# Patient Record
Sex: MALE | Race: WHITE | NOT HISPANIC OR LATINO
[De-identification: names, ages, dates, MRNs, and addresses within clinical notes are randomized per-mention and may not be internally consistent; named-entity substitution may affect disease eponyms.]

---

## 2017-01-25 ENCOUNTER — APPOINTMENT (OUTPATIENT)
Dept: FAMILY MEDICINE | Facility: CLINIC | Age: 82
End: 2017-01-25

## 2017-01-25 VITALS — DIASTOLIC BLOOD PRESSURE: 60 MMHG | SYSTOLIC BLOOD PRESSURE: 110 MMHG

## 2017-01-25 VITALS — TEMPERATURE: 97.7 F

## 2017-01-25 DIAGNOSIS — R51 HEADACHE: ICD-10-CM

## 2017-01-25 DIAGNOSIS — I46.9 CARDIAC ARREST, CAUSE UNSPECIFIED: ICD-10-CM

## 2017-02-23 ENCOUNTER — APPOINTMENT (OUTPATIENT)
Dept: FAMILY MEDICINE | Facility: CLINIC | Age: 82
End: 2017-02-23

## 2017-02-23 VITALS — SYSTOLIC BLOOD PRESSURE: 100 MMHG | DIASTOLIC BLOOD PRESSURE: 60 MMHG | TEMPERATURE: 98.3 F

## 2017-02-23 RX ORDER — QUETIAPINE 25 MG/1
25 TABLET, FILM COATED ORAL
Refills: 0 | Status: ACTIVE | COMMUNITY

## 2017-02-23 RX ORDER — ATROPINE SULFATE 10 MG/ML
1 SOLUTION OPHTHALMIC
Refills: 0 | Status: ACTIVE | COMMUNITY

## 2017-02-23 RX ORDER — RIVASTIGMINE 13.3 MG/24H
PATCH, EXTENDED RELEASE TRANSDERMAL
Refills: 0 | Status: ACTIVE | COMMUNITY

## 2017-03-08 ENCOUNTER — INPATIENT (INPATIENT)
Facility: HOSPITAL | Age: 82
LOS: 0 days | Discharge: HOME | DRG: 56 | End: 2017-03-09
Attending: HOSPITALIST | Admitting: INTERNAL MEDICINE
Payer: MEDICARE

## 2017-03-08 VITALS
SYSTOLIC BLOOD PRESSURE: 141 MMHG | HEART RATE: 67 BPM | TEMPERATURE: 98 F | RESPIRATION RATE: 18 BRPM | OXYGEN SATURATION: 99 % | DIASTOLIC BLOOD PRESSURE: 76 MMHG

## 2017-03-08 DIAGNOSIS — Z98.89 OTHER SPECIFIED POSTPROCEDURAL STATES: Chronic | ICD-10-CM

## 2017-03-08 DIAGNOSIS — G20 PARKINSON'S DISEASE: ICD-10-CM

## 2017-03-08 DIAGNOSIS — I10 ESSENTIAL (PRIMARY) HYPERTENSION: ICD-10-CM

## 2017-03-08 DIAGNOSIS — Z95.1 PRESENCE OF AORTOCORONARY BYPASS GRAFT: Chronic | ICD-10-CM

## 2017-03-08 DIAGNOSIS — I25.10 ATHEROSCLEROTIC HEART DISEASE OF NATIVE CORONARY ARTERY WITHOUT ANGINA PECTORIS: ICD-10-CM

## 2017-03-08 DIAGNOSIS — N40.0 BENIGN PROSTATIC HYPERPLASIA WITHOUT LOWER URINARY TRACT SYMPTOMS: ICD-10-CM

## 2017-03-08 DIAGNOSIS — Z41.8 ENCOUNTER FOR OTHER PROCEDURES FOR PURPOSES OTHER THAN REMEDYING HEALTH STATE: ICD-10-CM

## 2017-03-08 DIAGNOSIS — R55 SYNCOPE AND COLLAPSE: ICD-10-CM

## 2017-03-08 LAB
ALBUMIN SERPL ELPH-MCNC: 4.1 G/DL — SIGNIFICANT CHANGE UP (ref 3.3–5)
ALP SERPL-CCNC: 141 U/L — HIGH (ref 40–120)
ALT FLD-CCNC: 5 U/L RC — LOW (ref 10–45)
ANION GAP SERPL CALC-SCNC: 12 MMOL/L — SIGNIFICANT CHANGE UP (ref 5–17)
APPEARANCE UR: CLEAR — SIGNIFICANT CHANGE UP
APTT BLD: 24.9 SEC — LOW (ref 27.5–37.4)
AST SERPL-CCNC: 15 U/L — SIGNIFICANT CHANGE UP (ref 10–40)
BASOPHILS # BLD AUTO: 0 K/UL — SIGNIFICANT CHANGE UP (ref 0–0.2)
BASOPHILS NFR BLD AUTO: 0 % — SIGNIFICANT CHANGE UP (ref 0–2)
BILIRUB SERPL-MCNC: 0.8 MG/DL — SIGNIFICANT CHANGE UP (ref 0.2–1.2)
BILIRUB UR-MCNC: NEGATIVE — SIGNIFICANT CHANGE UP
BUN SERPL-MCNC: 22 MG/DL — SIGNIFICANT CHANGE UP (ref 7–23)
CALCIUM SERPL-MCNC: 9.3 MG/DL — SIGNIFICANT CHANGE UP (ref 8.4–10.5)
CHLORIDE SERPL-SCNC: 105 MMOL/L — SIGNIFICANT CHANGE UP (ref 96–108)
CK MB CFR SERPL CALC: 2.5 NG/ML — SIGNIFICANT CHANGE UP (ref 0–6.7)
CO2 SERPL-SCNC: 27 MMOL/L — SIGNIFICANT CHANGE UP (ref 22–31)
COLOR SPEC: YELLOW — SIGNIFICANT CHANGE UP
CREAT SERPL-MCNC: 1.13 MG/DL — SIGNIFICANT CHANGE UP (ref 0.5–1.3)
DIFF PNL FLD: ABNORMAL
EOSINOPHIL # BLD AUTO: 0.2 K/UL — SIGNIFICANT CHANGE UP (ref 0–0.5)
EOSINOPHIL NFR BLD AUTO: 1.9 % — SIGNIFICANT CHANGE UP (ref 0–6)
GLUCOSE SERPL-MCNC: 149 MG/DL — HIGH (ref 70–99)
GLUCOSE UR QL: NEGATIVE — SIGNIFICANT CHANGE UP
HCT VFR BLD CALC: 38.2 % — LOW (ref 39–50)
HGB BLD-MCNC: 13.5 G/DL — SIGNIFICANT CHANGE UP (ref 13–17)
INR BLD: 1.07 RATIO — SIGNIFICANT CHANGE UP (ref 0.88–1.16)
KETONES UR-MCNC: NEGATIVE — SIGNIFICANT CHANGE UP
LEUKOCYTE ESTERASE UR-ACNC: NEGATIVE — SIGNIFICANT CHANGE UP
LYMPHOCYTES # BLD AUTO: 28.4 % — SIGNIFICANT CHANGE UP (ref 13–44)
LYMPHOCYTES # BLD AUTO: 3.3 K/UL — SIGNIFICANT CHANGE UP (ref 1–3.3)
MCHC RBC-ENTMCNC: 33.7 PG — SIGNIFICANT CHANGE UP (ref 27–34)
MCHC RBC-ENTMCNC: 35.2 GM/DL — SIGNIFICANT CHANGE UP (ref 32–36)
MCV RBC AUTO: 95.6 FL — SIGNIFICANT CHANGE UP (ref 80–100)
MONOCYTES # BLD AUTO: 0.7 K/UL — SIGNIFICANT CHANGE UP (ref 0–0.9)
MONOCYTES NFR BLD AUTO: 6 % — SIGNIFICANT CHANGE UP (ref 2–14)
NEUTROPHILS # BLD AUTO: 7.3 K/UL — SIGNIFICANT CHANGE UP (ref 1.8–7.4)
NEUTROPHILS NFR BLD AUTO: 63.6 % — SIGNIFICANT CHANGE UP (ref 43–77)
NITRITE UR-MCNC: NEGATIVE — SIGNIFICANT CHANGE UP
PH UR: 7 — SIGNIFICANT CHANGE UP (ref 4.8–8)
PLATELET # BLD AUTO: 206 K/UL — SIGNIFICANT CHANGE UP (ref 150–400)
POTASSIUM SERPL-MCNC: 4.7 MMOL/L — SIGNIFICANT CHANGE UP (ref 3.5–5.3)
POTASSIUM SERPL-SCNC: 4.7 MMOL/L — SIGNIFICANT CHANGE UP (ref 3.5–5.3)
PROT SERPL-MCNC: 7.2 G/DL — SIGNIFICANT CHANGE UP (ref 6–8.3)
PROT UR-MCNC: SIGNIFICANT CHANGE UP
PROTHROM AB SERPL-ACNC: 11.6 SEC — SIGNIFICANT CHANGE UP (ref 10–13.1)
RBC # BLD: 3.99 M/UL — LOW (ref 4.2–5.8)
RBC # FLD: 13.7 % — SIGNIFICANT CHANGE UP (ref 10.3–14.5)
SODIUM SERPL-SCNC: 144 MMOL/L — SIGNIFICANT CHANGE UP (ref 135–145)
SP GR SPEC: 1.02 — SIGNIFICANT CHANGE UP (ref 1.01–1.02)
TROPONIN T SERPL-MCNC: <0.01 NG/ML — SIGNIFICANT CHANGE UP (ref 0–0.06)
UROBILINOGEN FLD QL: 1
WBC # BLD: 11.5 K/UL — HIGH (ref 3.8–10.5)
WBC # FLD AUTO: 11.5 K/UL — HIGH (ref 3.8–10.5)

## 2017-03-08 PROCEDURE — 99223 1ST HOSP IP/OBS HIGH 75: CPT | Mod: GC

## 2017-03-08 PROCEDURE — 99285 EMERGENCY DEPT VISIT HI MDM: CPT

## 2017-03-08 RX ORDER — ONDANSETRON 8 MG/1
4 TABLET, FILM COATED ORAL ONCE
Qty: 0 | Refills: 0 | Status: COMPLETED | OUTPATIENT
Start: 2017-03-08 | End: 2017-03-08

## 2017-03-08 RX ORDER — ASPIRIN/CALCIUM CARB/MAGNESIUM 324 MG
81 TABLET ORAL DAILY
Qty: 0 | Refills: 0 | Status: DISCONTINUED | OUTPATIENT
Start: 2017-03-08 | End: 2017-03-09

## 2017-03-08 RX ORDER — DOCUSATE SODIUM 100 MG
100 CAPSULE ORAL
Qty: 0 | Refills: 0 | Status: DISCONTINUED | OUTPATIENT
Start: 2017-03-08 | End: 2017-03-09

## 2017-03-08 RX ORDER — FOLIC ACID 0.8 MG
1 TABLET ORAL DAILY
Qty: 0 | Refills: 0 | Status: DISCONTINUED | OUTPATIENT
Start: 2017-03-08 | End: 2017-03-09

## 2017-03-08 RX ORDER — CHOLECALCIFEROL (VITAMIN D3) 125 MCG
400 CAPSULE ORAL DAILY
Qty: 0 | Refills: 0 | Status: DISCONTINUED | OUTPATIENT
Start: 2017-03-08 | End: 2017-03-09

## 2017-03-08 RX ORDER — LOSARTAN POTASSIUM 100 MG/1
25 TABLET, FILM COATED ORAL DAILY
Qty: 0 | Refills: 0 | Status: DISCONTINUED | OUTPATIENT
Start: 2017-03-08 | End: 2017-03-09

## 2017-03-08 RX ORDER — PANTOPRAZOLE SODIUM 20 MG/1
40 TABLET, DELAYED RELEASE ORAL
Qty: 0 | Refills: 0 | Status: DISCONTINUED | OUTPATIENT
Start: 2017-03-08 | End: 2017-03-09

## 2017-03-08 RX ORDER — ENOXAPARIN SODIUM 100 MG/ML
40 INJECTION SUBCUTANEOUS EVERY 24 HOURS
Qty: 0 | Refills: 0 | Status: DISCONTINUED | OUTPATIENT
Start: 2017-03-08 | End: 2017-03-09

## 2017-03-08 RX ORDER — ATORVASTATIN CALCIUM 80 MG/1
40 TABLET, FILM COATED ORAL AT BEDTIME
Qty: 0 | Refills: 0 | Status: DISCONTINUED | OUTPATIENT
Start: 2017-03-08 | End: 2017-03-09

## 2017-03-08 RX ORDER — CARBIDOPA AND LEVODOPA 25; 100 MG/1; MG/1
1.5 TABLET ORAL THREE TIMES A DAY
Qty: 0 | Refills: 0 | Status: DISCONTINUED | OUTPATIENT
Start: 2017-03-08 | End: 2017-03-09

## 2017-03-08 RX ORDER — FINASTERIDE 5 MG/1
5 TABLET, FILM COATED ORAL DAILY
Qty: 0 | Refills: 0 | Status: DISCONTINUED | OUTPATIENT
Start: 2017-03-08 | End: 2017-03-09

## 2017-03-08 RX ORDER — SODIUM CHLORIDE 9 MG/ML
500 INJECTION INTRAMUSCULAR; INTRAVENOUS; SUBCUTANEOUS ONCE
Qty: 0 | Refills: 0 | Status: COMPLETED | OUTPATIENT
Start: 2017-03-08 | End: 2017-03-08

## 2017-03-08 RX ORDER — CARBIDOPA AND LEVODOPA 25; 100 MG/1; MG/1
1 TABLET ORAL AT BEDTIME
Qty: 0 | Refills: 0 | Status: DISCONTINUED | OUTPATIENT
Start: 2017-03-08 | End: 2017-03-09

## 2017-03-08 RX ADMIN — ENOXAPARIN SODIUM 40 MILLIGRAM(S): 100 INJECTION SUBCUTANEOUS at 22:55

## 2017-03-08 RX ADMIN — SODIUM CHLORIDE 500 MILLILITER(S): 9 INJECTION INTRAMUSCULAR; INTRAVENOUS; SUBCUTANEOUS at 11:01

## 2017-03-08 RX ADMIN — Medication 400 UNIT(S): at 17:19

## 2017-03-08 RX ADMIN — CARBIDOPA AND LEVODOPA 1 TABLET(S): 25; 100 TABLET ORAL at 23:12

## 2017-03-08 RX ADMIN — ATORVASTATIN CALCIUM 40 MILLIGRAM(S): 80 TABLET, FILM COATED ORAL at 22:55

## 2017-03-08 RX ADMIN — ONDANSETRON 4 MILLIGRAM(S): 8 TABLET, FILM COATED ORAL at 11:01

## 2017-03-08 RX ADMIN — CARBIDOPA AND LEVODOPA 1.5 TABLET(S): 25; 100 TABLET ORAL at 17:19

## 2017-03-08 RX ADMIN — Medication 100 MILLIGRAM(S): at 17:19

## 2017-03-08 RX ADMIN — LOSARTAN POTASSIUM 25 MILLIGRAM(S): 100 TABLET, FILM COATED ORAL at 17:20

## 2017-03-08 RX ADMIN — Medication 1 MILLIGRAM(S): at 17:20

## 2017-03-08 RX ADMIN — FINASTERIDE 5 MILLIGRAM(S): 5 TABLET, FILM COATED ORAL at 17:19

## 2017-03-08 NOTE — H&P ADULT. - PROBLEM SELECTOR PLAN 3
Continue with Sinemet + selegline. Will also continue seroquel which was added due to patient's visual hallucinations

## 2017-03-08 NOTE — H&P ADULT. - ASSESSMENT
84 year old male PMH of Parkinson’s (10 years), CAD s/p CABG in 1988 complicated by an MI in 1998 from bypass occlusion,  HTN and hx of syncope (last 2/17) in the past presents with an episode of syncope. Patient has extensive hx of syncope with negative acute neuro or cardiac workup in the past. 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

## 2017-03-08 NOTE — ED PROVIDER NOTE - ATTENDING CONTRIBUTION TO CARE
pt is a 86 y/o male with cabg, cad, parkinsons with shy drager diagnosis on recent syncope a few months ago at [facility] presents with syncope again today likely due to his condition, has a loop recorder, ekg nsr with no changes. cardiac work up ordered, to discuss with pmd.

## 2017-03-08 NOTE — H&P ADULT. - PMH
BPH (benign prostatic hyperplasia)    CAD (coronary artery disease)    Essential hypertension    Osteoporosis    Parkinsons disease    Spinal stenosis of lumbar region    Spondylolisthesis

## 2017-03-08 NOTE — H&P ADULT. - NEUROLOGICAL DETAILS
responds to pain/cranial nerves intact/responds to verbal commands/sensation intact/AAOx2/deep reflexes intact

## 2017-03-08 NOTE — H&P ADULT. - PROBLEM SELECTOR PLAN 2
Continue with current outpatient regimen. Pt has hx of extremely labile pressures but has had overall regimen downtitrated due to likely dysautonomia component of syncope Continue with current outpatient regimen. Pt has hx of extremely labile pressures but has had overall regimen down titrated due to likely dysautonomia component of syncope

## 2017-03-08 NOTE — H&P ADULT. - HISTORY OF PRESENT ILLNESS
84 year old male PMH of Parkinson’s (10 years), CAD s/p CABG in 1988 complicated by an MI in 1998 from bypass occlusion,  HTN and hx of syncope (last 2/17) in the past presents with an episode of syncope. Per pts wife he finished eating breakfast and as he stood up his eyes rolled back, he became extremely diaphoretic and then began to fall to the ground. He was caught by his wife and his aide who layed him on the ground and then tried to arouse him. He was unarousable for about 10 minutes before suddenly becoming responsive as EMS arrived. The wife denied any convulsions, tounge biting or incontinence. The patient doesn't remember the event from the beginning to the end. He denies any aura before passing out. In regards to the past few days the patient denies any cough, SOB, fever, chest pain, diarrhea, constipation, dysuria. Wife does note that patient did have decreased appetite and less PO intake then usual.  In ED patient was at baseline mental status. VS Bp 141/76, HR 67, T 98, Sat 98%. Labwork remarkable for mild leukocytosis to 11. Negative UA.

## 2017-03-08 NOTE — ED ADULT NURSE NOTE - OBJECTIVE STATEMENT
Patient  is  alert  and  oriented x3.  Color  is   good  and skin warm  to  touch.  He had   a  syncopal  episode    at  home.   He  is  c/o  nausea. He  denies  chest  pain.

## 2017-03-08 NOTE — ED PROVIDER NOTE - NEUROLOGICAL, MLM
Alert and oriented, no focal deficits, motor deficits are at baseline per patient and family for his parkinson's. diffuse muscle weakness with slowness of movement. sensory grossly intact.

## 2017-03-08 NOTE — ED PROVIDER NOTE - OBJECTIVE STATEMENT
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 Pt and daughter state that he was walking to the bathroom with his aide and his walker when she 'noted that his eyes were rolling back in his head and they lowered him to the ground.'

## 2017-03-08 NOTE — PROVIDER CONTACT NOTE (MEDICATION) - ACTION/TREATMENT ORDERED:
MD notified and made aware. pt educated on risks of refusing lovenox. no further interventions at this time. will continue to monitor.

## 2017-03-08 NOTE — H&P ADULT. - PSH
History of back surgery  vertebroplasty  History of hernia repair    History of loop recorder    S/P CABG x 1

## 2017-03-08 NOTE — H&P ADULT. - PROBLEM SELECTOR PLAN 1
Pt has extensive hx of syncope with negative workup in past prompting belief that events are likely 2/2 dysautonomia in setting of parkinsons disease.   -Pt may have component of orthostasis in setting of decreased appetite for past 2 days- Will assess orthostatic BP   -Will get loop recording data to ensure no cardiac event precipitated syncope, EKG at baseline and Adi WNL  -Will discuss with Neuro but appears imaging workup extensive in past with no acute findings Pt has extensive hx of syncope with negative workup in past prompting belief that events are likely 2/2 dysautonomia in setting of parkinson's disease or vasovagal.   -Pt may have component of orthostasis in setting of decreased appetite for past 2 days- Will assess orthostatic BP   -Will get loop recording data to ensure no cardiac event precipitated syncope, EKG at baseline and Adi WNL  -Neuro consult called. Will arrange for loop recorder reading.

## 2017-03-09 ENCOUNTER — TRANSCRIPTION ENCOUNTER (OUTPATIENT)
Age: 82
End: 2017-03-09

## 2017-03-09 VITALS — SYSTOLIC BLOOD PRESSURE: 170 MMHG | DIASTOLIC BLOOD PRESSURE: 85 MMHG

## 2017-03-09 LAB
ANION GAP SERPL CALC-SCNC: 14 MMOL/L — SIGNIFICANT CHANGE UP (ref 5–17)
BUN SERPL-MCNC: 20 MG/DL — SIGNIFICANT CHANGE UP (ref 7–23)
CALCIUM SERPL-MCNC: 9.1 MG/DL — SIGNIFICANT CHANGE UP (ref 8.4–10.5)
CHLORIDE SERPL-SCNC: 102 MMOL/L — SIGNIFICANT CHANGE UP (ref 96–108)
CO2 SERPL-SCNC: 25 MMOL/L — SIGNIFICANT CHANGE UP (ref 22–31)
CREAT SERPL-MCNC: 0.96 MG/DL — SIGNIFICANT CHANGE UP (ref 0.5–1.3)
GLUCOSE SERPL-MCNC: 90 MG/DL — SIGNIFICANT CHANGE UP (ref 70–99)
HCT VFR BLD CALC: 41.1 % — SIGNIFICANT CHANGE UP (ref 39–50)
HGB BLD-MCNC: 14.1 G/DL — SIGNIFICANT CHANGE UP (ref 13–17)
MAGNESIUM SERPL-MCNC: 2.1 MG/DL — SIGNIFICANT CHANGE UP (ref 1.6–2.6)
MCHC RBC-ENTMCNC: 32.8 PG — SIGNIFICANT CHANGE UP (ref 27–34)
MCHC RBC-ENTMCNC: 34.2 GM/DL — SIGNIFICANT CHANGE UP (ref 32–36)
MCV RBC AUTO: 95.8 FL — SIGNIFICANT CHANGE UP (ref 80–100)
PHOSPHATE SERPL-MCNC: 2.4 MG/DL — LOW (ref 2.5–4.5)
PLATELET # BLD AUTO: 186 K/UL — SIGNIFICANT CHANGE UP (ref 150–400)
POTASSIUM SERPL-MCNC: 4.2 MMOL/L — SIGNIFICANT CHANGE UP (ref 3.5–5.3)
POTASSIUM SERPL-SCNC: 4.2 MMOL/L — SIGNIFICANT CHANGE UP (ref 3.5–5.3)
RBC # BLD: 4.29 M/UL — SIGNIFICANT CHANGE UP (ref 4.2–5.8)
RBC # FLD: 13.9 % — SIGNIFICANT CHANGE UP (ref 10.3–14.5)
SODIUM SERPL-SCNC: 141 MMOL/L — SIGNIFICANT CHANGE UP (ref 135–145)
WBC # BLD: 11.7 K/UL — HIGH (ref 3.8–10.5)
WBC # FLD AUTO: 11.7 K/UL — HIGH (ref 3.8–10.5)

## 2017-03-09 PROCEDURE — 99285 EMERGENCY DEPT VISIT HI MDM: CPT | Mod: 25

## 2017-03-09 PROCEDURE — 82553 CREATINE MB FRACTION: CPT

## 2017-03-09 PROCEDURE — 80048 BASIC METABOLIC PNL TOTAL CA: CPT

## 2017-03-09 PROCEDURE — 80053 COMPREHEN METABOLIC PANEL: CPT

## 2017-03-09 PROCEDURE — 99239 HOSP IP/OBS DSCHRG MGMT >30: CPT

## 2017-03-09 PROCEDURE — G0378: CPT

## 2017-03-09 PROCEDURE — 85610 PROTHROMBIN TIME: CPT

## 2017-03-09 PROCEDURE — 96374 THER/PROPH/DIAG INJ IV PUSH: CPT

## 2017-03-09 PROCEDURE — 99223 1ST HOSP IP/OBS HIGH 75: CPT | Mod: AI

## 2017-03-09 PROCEDURE — 84484 ASSAY OF TROPONIN QUANT: CPT

## 2017-03-09 PROCEDURE — 81001 URINALYSIS AUTO W/SCOPE: CPT

## 2017-03-09 PROCEDURE — 85027 COMPLETE CBC AUTOMATED: CPT

## 2017-03-09 PROCEDURE — 85730 THROMBOPLASTIN TIME PARTIAL: CPT

## 2017-03-09 PROCEDURE — 84100 ASSAY OF PHOSPHORUS: CPT

## 2017-03-09 PROCEDURE — 82962 GLUCOSE BLOOD TEST: CPT

## 2017-03-09 PROCEDURE — 83735 ASSAY OF MAGNESIUM: CPT

## 2017-03-09 RX ORDER — METOPROLOL TARTRATE 50 MG
0.5 TABLET ORAL
Qty: 30 | Refills: 0 | OUTPATIENT
Start: 2017-03-09 | End: 2017-04-08

## 2017-03-09 RX ADMIN — FINASTERIDE 5 MILLIGRAM(S): 5 TABLET, FILM COATED ORAL at 12:35

## 2017-03-09 RX ADMIN — LOSARTAN POTASSIUM 25 MILLIGRAM(S): 100 TABLET, FILM COATED ORAL at 05:48

## 2017-03-09 RX ADMIN — Medication 1 MILLIGRAM(S): at 12:35

## 2017-03-09 RX ADMIN — Medication 400 UNIT(S): at 12:36

## 2017-03-09 RX ADMIN — Medication 100 MILLIGRAM(S): at 05:48

## 2017-03-09 RX ADMIN — CARBIDOPA AND LEVODOPA 1.5 TABLET(S): 25; 100 TABLET ORAL at 05:48

## 2017-03-09 RX ADMIN — PANTOPRAZOLE SODIUM 40 MILLIGRAM(S): 20 TABLET, DELAYED RELEASE ORAL at 05:48

## 2017-03-09 RX ADMIN — CARBIDOPA AND LEVODOPA 1.5 TABLET(S): 25; 100 TABLET ORAL at 12:37

## 2017-03-09 RX ADMIN — Medication 81 MILLIGRAM(S): at 12:35

## 2017-03-09 NOTE — DISCHARGE NOTE ADULT - PATIENT PORTAL LINK FT
“You can access the Follow[facility] Patient Portal, offered by [facility], by registering with the following website: [email or web address]”

## 2017-03-09 NOTE — DISCHARGE NOTE ADULT - MEDICATION SUMMARY - MEDICATIONS TO TAKE
I will START or STAY ON the medications listed below when I get home from the hospital:    finasteride 5 mg oral tablet  -- 1 tab(s) by mouth once a day  -- Verified with [facility] @ [place] [phone]  -- Indication: For BPH (benign prostatic hyperplasia)    Aspirin Enteric Coated 81 mg oral delayed release tablet  -- 1 tab(s) by mouth once a day  -- Verified with [facility] @ [place] [phone]  -- Indication: For CAD (coronary artery disease)    Tylenol  -- 1 tab(s) by mouth every 6 hours, As Needed for pain.   -- Indication: For Pain    Lipitor 40 mg oral tablet  -- 1 tab(s) by mouth once a day (at bedtime)  -- Verified with [facility] @ [place] [phone]  -- Indication: For HLD    selegiline 5 mg oral tablet  -- 1 tab(s) by mouth 2 times a day  -- Verified with [facility] @ [place] [phone]  -- Indication: For Parkinsons disease    carbidopa-levodopa 25 mg-100 mg oral tablet  -- 1.5 tab(s) by mouth 3 times a day  -- Verified with [facility] @ [place] [phone]  -- Indication: For Parkinsons disease    carbidopa-levodopa 25 mg-100 mg oral tablet, extended release  -- 1 tab(s) by mouth once a day (at bedtime)  -- Verified with [facility] @ [place] [phone]  -- Indication: For Parkinsons disease    metoprolol tartrate 25 mg oral tablet  -- 0.5 tab(s) by mouth 2 times a day  -- It is very important that you take or use this exactly as directed.  Do not skip doses or discontinue unless directed by your doctor.  May cause drowsiness.  Alcohol may intensify this effect.  Use care when operating dangerous machinery.  Some non-prescription drugs may aggravate your condition.  Read all labels carefully.  If a warning appears, check with your doctor before taking.  Take with food or milk.  This drug may impair the ability to drive or operate machinery.  Use care until you become familiar with its effects.    -- Indication: For HTN    rivastigmine 4.6 mg/24 hr transdermal film, extended release  -- 1 patch by transdermal patch every 24 hours  -- Verified with [facility] @ [facility] [phone]  -- Indication: For Parkinsons disease    famotidine 40 mg oral tablet  -- 1 tab(s) by mouth once a day  -- Verified with [facility] @ [facility] [phone]  -- Indication: For GERD    Colace 100 mg oral capsule  -- 1 cap(s) by mouth 2 times a day  -- Indication: For Constipation    omeprazole 40 mg oral delayed release capsule  -- 1 cap(s) by mouth once a day  -- Verified with [facility] @ [place] [phone]  -- Indication: For GERD    folic acid 0.4 mg oral tablet  -- 1 tab(s) by mouth once a day  -- Verified with [facility] @ [employer] [facility] [phone]  -- Indication: For Ppx    Vitamin D3  -- 1  by mouth once a day  -- Indication: For Ppx I will START or STAY ON the medications listed below when I get home from the hospital:    finasteride 5 mg oral tablet  -- 1 tab(s) by mouth once a day  -- Verified with [facility] @ [place] [phone]  -- Indication: For BPH (benign prostatic hyperplasia)    Aspirin Enteric Coated 81 mg oral delayed release tablet  -- 1 tab(s) by mouth once a day  -- Verified with [facility] @ [place] [phone]  -- Indication: For CAD (coronary artery disease)    Tylenol  -- 1 tab(s) by mouth every 6 hours, As Needed for pain.   -- Indication: For Pain    Lipitor 40 mg oral tablet  -- 1 tab(s) by mouth once a day (at bedtime)  -- Verified with [facility] @ [place] [phone]  -- Indication: For HLD    selegiline 5 mg oral tablet  -- 1 tab(s) by mouth 2 times a day  -- Verified with [facility] @ [place] [address] [phone]  -- Indication: For Parkinsons disease    carbidopa-levodopa 25 mg-100 mg oral tablet  -- 1.5 tab(s) by mouth 3 times a day  -- Verified with [facility] @ [place] [address] [phone]  -- Indication: For Parkinsons disease    carbidopa-levodopa 25 mg-100 mg oral tablet, extended release  -- 1 tab(s) by mouth once a day (at bedtime)  -- Verified with [facility] @ [place] [phone]  -- Indication: For Parkinsons disease    rivastigmine 4.6 mg/24 hr transdermal film, extended release  -- 1 patch by transdermal patch every 24 hours  -- [facility] [phone]  -- Indication: For Parkinsons disease    famotidine 40 mg oral tablet  -- 1 tab(s) by mouth once a day  -- Verified with [facility] [phone]  -- Indication: For GERD    Colace 100 mg oral capsule  -- 1 cap(s) by mouth 2 times a day  -- Indication: For Constipation    omeprazole 40 mg oral delayed release capsule  -- 1 cap(s) by mouth once a day  -- Verified with [facility] [phone]  -- Indication: For GERD    folic acid 0.4 mg oral tablet  -- 1 tab(s) by mouth once a day  -- [facility] [phone]  -- Indication: For Ppx    Vitamin D3  -- 1  by mouth once a day  -- Indication: For Ppx I will START or STAY ON the medications listed below when I get home from the hospital:    finasteride 5 mg oral tablet  -- 1 tab(s) by mouth once a day  -- Verified with [facility] @ [place] [phone]  -- Indication: For BPH (benign prostatic hyperplasia)    Aspirin Enteric Coated 81 mg oral delayed release tablet  -- 1 tab(s) by mouth once a day  -- Verified with [facility] @ [place] [phone]  -- Indication: For CAD (coronary artery disease)    Tylenol  -- 1 tab(s) by mouth every 6 hours, As Needed for pain.   -- Indication: For Pain    Lipitor 40 mg oral tablet  -- 1 tab(s) by mouth once a day (at bedtime)  -- Verified with [facility] @ [place] [phone]  -- Indication: For HLD    selegiline 5 mg oral tablet  -- 1 tab(s) by mouth 2 times a day  -- Verified with [facility] @ [place] [address] [phone]  -- Indication: For Parkinsons disease    carbidopa-levodopa 25 mg-100 mg oral tablet  -- 1.5 tab(s) by mouth 3 times a day  -- Verified with [facility] @ [place] [address] [phone]  -- Indication: For Parkinsons disease    carbidopa-levodopa 25 mg-100 mg oral tablet, extended release  -- 1 tab(s) by mouth once a day (at bedtime)  -- Verified with [facility] @ [place] [phone]  -- Indication: For Parkinsons disease    rivastigmine 4.6 mg/24 hr transdermal film, extended release  -- 1 patch by transdermal patch every 24 hours  -- [facility] [phone]  -- Indication: For Parkinsons disease    famotidine 40 mg oral tablet  -- 1 tab(s) by mouth once a day  -- [facility] [phone]  -- Indication: For GERD    Colace 100 mg oral capsule  -- 1 cap(s) by mouth 2 times a day  -- Indication: For Constipation    omeprazole 40 mg oral delayed release capsule  -- 1 cap(s) by mouth once a day  -- Verified with [employer] [facility] [phone]  -- Indication: For GERD    folic acid 0.4 mg oral tablet  -- 1 tab(s) by mouth once a day  -- Ver[facility] with [employer] [facility] [phone]  -- Indication: For Ppx    Vitamin D3  -- 1  by mouth once a day  -- Indication: For Ppx

## 2017-03-09 NOTE — DISCHARGE NOTE ADULT - MEDICATION SUMMARY - MEDICATIONS TO STOP TAKING
I will STOP taking the medications listed below when I get home from the hospital:    Benicar 20 mg oral tablet  -- 1 tab(s) by mouth once a day  -- Verified with Delmy Otto @ [facility] [phone] I will STOP taking the medications listed below when I get home from the hospital:    Benicar 20 mg oral tablet  -- 1 tab(s) by mouth once a day  -- Verified with Delmy Otto @ [facility] [phone] I will STOP taking the medications listed below when I get home from the hospital:    Benicar 20 mg oral tablet  -- 1 tab(s) by mouth once a day  -- Verified with Delmy Otto @ [facility] [phone]

## 2017-03-09 NOTE — DISCHARGE NOTE ADULT - CARE PROVIDERS DIRECT ADDRESSES
,DirectAddress_Unknown,[email or web address],DirectAddress_Unknown

## 2017-03-09 NOTE — DISCHARGE NOTE ADULT - PLAN OF CARE
Outpatient management You experienced another episode of syncope likely related to your autonomic dysfunction in the setting of your parkinsons disease. We have adjusted your blood pressure medication for further management. Please follow up with Dr. Gomez and Dr. ha for further management Please continue your home regimen of sinemet and selegline. F/u with Dr. Knowles for further management We have adjusted your blood pressure medications. Please stop taking benicar and start taking metoprolol. please follow up with Dr. Gomez for further adjustments Please continue with Proscar and flomax Outpatient follow up We have adjusted your blood pressure medications. Please stop taking benicar. Please follow up with Dr. Gomez for further adjustments as scheduled after discharge.

## 2017-03-09 NOTE — DISCHARGE NOTE ADULT - HOSPITAL COURSE
84 year old male PMH of Parkinson’s (10 years), CAD s/p CABG in 1988 complicated by an MI in 1998 from bypass occlusion,  HTN and hx of syncope (last 2/17) in the past presents with an episode of syncope. Per pts wife he finished eating breakfast and as he stood up his eyes rolled back, he became extremely diaphoretic and then began to fall to the ground. He was caught by his wife and his aide who layed him on the ground and then tried to arouse him. He was unarousable for about 10 minutes before suddenly becoming responsive as EMS arrived. The wife denied any convulsions, tounge biting or incontinence. The patient doesn't remember the event from the beginning to the end. He denies any aura before passing out. In regards to the past few days the patient denies any cough, SOB, fever, chest pain, diarrhea, constipation, dysuria. Wife does note that patient did have decreased appetite and less PO intake then usual.  In ED patient was at baseline mental status. VS Bp 141/76, HR 67, T 98, Sat 98%. Labwork remarkable for mild leukocytosis to 11. Negative UA.   Patient was admitted to medicine for further evaluation and management. Based on the patient's history of syncope with negative workup the medicine team decided to hold off further neurologic imaging as the patient had improved clinically and no longer endorsed dizziness or lightheadedness. The decision was made to observe him overnight with neuro consult from Dr. Resendez, his outpatient parkinsons specialist and an EP evaluation of his loop recorder in the AM. The patient had no events ON and was able to ambulate in the AM. Neurology evaluated the patient had agreed with the medical team's decision to withhold imaging. On further discussion based on the likely component of orthostasis during these syncopal events it was decided to switch Benicar to metoprolol for HTN management. There were no events on EP interrogation so the patient. The patient was clear for discharge. 84 year old male PMH of Parkinson’s (10 years), CAD s/p CABG in 1988 complicated by an MI in 1998 from bypass occlusion,  HTN and hx of syncope (last 2/17) in the past presents with an episode of syncope. Per pts wife he finished eating breakfast and as he stood up his eyes rolled back, he became extremely diaphoretic and then began to fall to the ground. He was caught by his wife and his aide who layed him on the ground and then tried to arouse him. He was unarousable for about 10 minutes before suddenly becoming responsive as EMS arrived. The wife denied any convulsions, tounge biting or incontinence. The patient doesn't remember the event from the beginning to the end. He denies any aura before passing out. In regards to the past few days the patient denies any cough, SOB, fever, chest pain, diarrhea, constipation, dysuria. Wife does note that patient did have decreased appetite and less PO intake then usual.  In ED patient was at baseline mental status. VS Bp 141/76, HR 67, T 98, Sat 98%. Labwork remarkable for mild leukocytosis to 11. Negative UA.   Patient was admitted to medicine for further evaluation and management. Based on the patient's history of syncope with negative workup the medicine team decided to hold off further neurologic imaging as the patient had improved clinically and no longer endorsed dizziness or lightheadedness. The decision was made to observe him overnight with neuro consult from Dr. Resendez, his outpatient parkinsons specialist and an EP evaluation of his loop recorder in the AM. The patient had no events ON and was able to ambulate in the AM. Neurology evaluated the patient had agreed with the medical team's decision to withhold imaging. On further discussion based on the likely component of orthostasis during these syncopal events it was decided to switch Benicar to metoprolol for HTN management. The loop recorder interrogation showed bradycardia in the 30s. However, EP attending Dr. Cortez discussed the case with the patient's primary care doctor and decided that based on the telemetry tracings the 84 year old male PMH of Parkinson’s (10 years), CAD s/p CABG in 1988 complicated by an MI in 1998 from bypass occlusion,  HTN and hx of syncope (last 2/17) in the past presents with an episode of syncope. Per pts wife he finished eating breakfast and as he stood up his eyes rolled back, he became extremely diaphoretic and then began to fall to the ground. He was caught by his wife and his aide who layed him on the ground and then tried to arouse him. He was unarousable for about 10 minutes before suddenly becoming responsive as EMS arrived. The wife denied any convulsions, tounge biting or incontinence. The patient doesn't remember the event from the beginning to the end. He denies any aura before passing out. In regards to the past few days the patient denies any cough, SOB, fever, chest pain, diarrhea, constipation, dysuria. Wife does note that patient did have decreased appetite and less PO intake then usual.  In ED patient was at baseline mental status. VS Bp 141/76, HR 67, T 98, Sat 98%. Labwork remarkable for mild leukocytosis to 11. Negative UA.   Patient was admitted to medicine for further evaluation and management. Based on the patient's history of syncope with negative workup the medicine team decided to hold off further neurologic imaging as the patient had improved clinically and no longer endorsed dizziness or lightheadedness. The decision was made to observe him overnight with neuro consult from Dr. Resendez, his outpatient parkinsons specialist and an EP evaluation of his loop recorder in the AM. The patient had no events ON and was able to ambulate in the AM. Neurology evaluated the patient had agreed with the medical team's decision to withhold imaging. The loop recorder interrogation showed bradycardia in the 30s. However, EP attending Dr. Cortez discussed the case with the patient's primary care doctor and decided that based on the telemetry tracings the syncopal episodes appear to be more vagally mediated rather than related to the bradycardia. Given the patient had several syncopal events unrelated to bradycardia, he did not feel a PPM was indicated at this time. EP attending discussed the case with the patient's PCP and mutually agreed to discharge the patient with close outpatient follow up.

## 2017-03-09 NOTE — DISCHARGE NOTE ADULT - CARE PROVIDER_API CALL
Yuliana Knowles (MD), Neurology  [address] [place] [id number]  Phone: [phone]  Fax: [phone]    Delmy Gomez (), Family Medicine  [address] [address]  Phone: [phone]  Fax: [phone]

## 2017-03-09 NOTE — PROVIDER CONTACT NOTE (OTHER) - ASSESSMENT
A&OX2-3, forgetful. denies any CP, sob, headache, blurry vision. pt asymptomatic. pt maintained in bed during night and this am.

## 2017-03-09 NOTE — DISCHARGE NOTE ADULT - CARE PLAN
Principal Discharge DX:	Syncope  Goal:	Outpatient management  Instructions for follow-up, activity and diet:	You experienced another episode of syncope likely related to your autonomic dysfunction in the setting of your parkinsons disease. We have adjusted your blood pressure medication for further management. Please follow up with Dr. Gomez and Dr. ha for further management  Secondary Diagnosis:	Parkinsons disease  Instructions for follow-up, activity and diet:	Please continue your home regimen of sinemet and selegline. F/u with Dr. Ha for further management  Secondary Diagnosis:	Essential hypertension  Instructions for follow-up, activity and diet:	We have adjusted your blood pressure medications. Please stop taking benicar and start taking metoprolol. please follow up with Dr. Gomez for further adjustments  Secondary Diagnosis:	BPH (benign prostatic hyperplasia)  Instructions for follow-up, activity and diet:	Please continue with Proscar and flomax Principal Discharge DX:	Syncope  Goal:	Outpatient management  Instructions for follow-up, activity and diet:	You experienced another episode of syncope likely related to your autonomic dysfunction in the setting of your parkinsons disease. We have adjusted your blood pressure medication for further management. Please follow up with Dr. Gomez and Dr. ha for further management  Secondary Diagnosis:	Parkinsons disease  Goal:	Outpatient management  Instructions for follow-up, activity and diet:	Please continue your home regimen of sinemet and selegline. F/u with Dr. Ha for further management  Secondary Diagnosis:	Essential hypertension  Goal:	Outpatient follow up  Instructions for follow-up, activity and diet:	We have adjusted your blood pressure medications. Please stop taking benicar. Please follow up with Dr. Gomez for further adjustments as scheduled after discharge.  Secondary Diagnosis:	BPH (benign prostatic hyperplasia)  Goal:	Outpatient follow up  Instructions for follow-up, activity and diet:	Please continue with Proscar and flomax Principal Discharge DX:	Syncope  Goal:	Outpatient management  Instructions for follow-up, activity and diet:	You experienced another episode of syncope likely related to your autonomic dysfunction in the setting of your parkinsons disease. We have adjusted your blood pressure medication for further management. Please follow up with Dr. Gomez and Dr. ha for further management  Secondary Diagnosis:	Parkinsons disease  Goal:	Outpatient management  Instructions for follow-up, activity and diet:	Please continue your home regimen of sinemet and selegline. F/u with Dr. Ha for further management  Secondary Diagnosis:	Essential hypertension  Goal:	Outpatient follow up  Instructions for follow-up, activity and diet:	We have adjusted your blood pressure medications. Please stop taking benicar. Please follow up with Dr. Gmoez for further adjustments as scheduled after discharge.  Secondary Diagnosis:	BPH (benign prostatic hyperplasia)  Goal:	Outpatient follow up  Instructions for follow-up, activity and diet:	Please continue with Proscar and flomax

## 2017-03-27 ENCOUNTER — APPOINTMENT (OUTPATIENT)
Dept: FAMILY MEDICINE | Facility: CLINIC | Age: 82
End: 2017-03-27

## 2017-03-27 VITALS — SYSTOLIC BLOOD PRESSURE: 108 MMHG | DIASTOLIC BLOOD PRESSURE: 70 MMHG

## 2017-03-27 DIAGNOSIS — Z95.0 PRESENCE OF CARDIAC PACEMAKER: ICD-10-CM

## 2017-05-02 ENCOUNTER — APPOINTMENT (OUTPATIENT)
Dept: FAMILY MEDICINE | Facility: CLINIC | Age: 82
End: 2017-05-02

## 2017-05-02 VITALS — TEMPERATURE: 97.8 F | SYSTOLIC BLOOD PRESSURE: 110 MMHG | DIASTOLIC BLOOD PRESSURE: 60 MMHG

## 2017-05-02 RX ORDER — ATENOLOL 25 MG/1
25 TABLET ORAL
Refills: 0 | Status: ACTIVE | COMMUNITY

## 2017-06-10 ENCOUNTER — RX RENEWAL (OUTPATIENT)
Age: 82
End: 2017-06-10

## 2017-07-06 ENCOUNTER — APPOINTMENT (OUTPATIENT)
Dept: FAMILY MEDICINE | Facility: CLINIC | Age: 82
End: 2017-07-06

## 2017-07-06 VITALS — WEIGHT: 107 LBS | BODY MASS INDEX: 20.05 KG/M2

## 2017-07-06 VITALS — SYSTOLIC BLOOD PRESSURE: 130 MMHG | TEMPERATURE: 98.3 F | DIASTOLIC BLOOD PRESSURE: 80 MMHG

## 2017-07-06 DIAGNOSIS — M62.81 MUSCLE WEAKNESS (GENERALIZED): ICD-10-CM

## 2017-07-06 DIAGNOSIS — M25.561 PAIN IN RIGHT KNEE: ICD-10-CM

## 2017-07-06 DIAGNOSIS — M25.562 PAIN IN RIGHT KNEE: ICD-10-CM

## 2017-07-10 ENCOUNTER — MESSAGE (OUTPATIENT)
Age: 82
End: 2017-07-10

## 2017-07-10 DIAGNOSIS — R39.9 UNSPECIFIED SYMPTOMS AND SIGNS INVOLVING THE GENITOURINARY SYSTEM: ICD-10-CM

## 2017-07-10 LAB
BASOPHILS # BLD AUTO: 0.01 K/UL
BASOPHILS NFR BLD AUTO: 0.1 %
CK SERPL-CCNC: 44 U/L
EOSINOPHIL # BLD AUTO: 0.17 K/UL
EOSINOPHIL NFR BLD AUTO: 1.5 %
ERYTHROCYTE [SEDIMENTATION RATE] IN BLOOD BY WESTERGREN METHOD: 15 MM/HR
HCT VFR BLD CALC: 41.9 %
HGB BLD-MCNC: 13.3 G/DL
IMM GRANULOCYTES NFR BLD AUTO: 0.4 %
LYMPHOCYTES # BLD AUTO: 2.47 K/UL
LYMPHOCYTES NFR BLD AUTO: 22 %
MAN DIFF?: NORMAL
MCHC RBC-ENTMCNC: 31.7 GM/DL
MCHC RBC-ENTMCNC: 31.9 PG
MCV RBC AUTO: 100.5 FL
MONOCYTES # BLD AUTO: 0.9 K/UL
MONOCYTES NFR BLD AUTO: 8 %
NEUTROPHILS # BLD AUTO: 7.64 K/UL
NEUTROPHILS NFR BLD AUTO: 68 %
PLATELET # BLD AUTO: 207 K/UL
RBC # BLD: 4.17 M/UL
RBC # FLD: 17 %
WBC # FLD AUTO: 11.24 K/UL

## 2017-07-11 ENCOUNTER — RESULT CHARGE (OUTPATIENT)
Age: 82
End: 2017-07-11

## 2017-07-11 PROBLEM — R39.9 UTI SYMPTOMS: Status: ACTIVE | Noted: 2017-07-11

## 2017-09-05 NOTE — H&P ADULT. - PROBLEM SELECTOR PROBLEM 1
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 Daughter is present    PROBLEM LIST    Patient Active Problem List   Diagnosis   • Congestive heart failure (CMS/HCC)   • Cerebrovascular accident (CVA) due to embolism of right middle cerebral artery (CMS/HCC)       Physical Exam    Vital Last Value 24 Hour Range   Temperature 99.5 °F (37.5 °C) Temp  Min: 97.8 °F (36.6 °C)  Max: 99.5 °F (37.5 °C)   Pulse 96 Pulse  Min: 62  Max: 120   Respiratory 18 Resp  Min: 18  Max: 18   Blood Pressure 149/66 BP  Min: 126/61  Max: 149/66   Pulse Oximetry 99 % SpO2  Min: 96 %  Max: 99 %   CVP   No Data Recorded     Vital Today Admit   Weight 131.2 kg Weight: 133.8 kg   Height N/A Height: 5' 5\" (165.1 cm)   BMI N/A BMI (Calculated): 49.19     Intake & Output        Intake/Output Summary (Last 24 hours) at 09/05/17 1252  Last data filed at 09/05/17 0600   Gross per 24 hour   Intake             2228 ml   Output              200 ml   Net             2028 ml     I/O last 3 completed shifts:  In: 2228 [NG/GT:2228]  Out: 200 [Urine:200]  No intake/output data recorded.    Last Stool Occurrence: 1 (small) (09/04/17 0115)    Weight over the past 48 Hours:    Weight    08/14/17 1915 08/16/17 0600 08/17/17 0530 08/23/17 0500   Weight: 133.8 kg 128.5 kg 129.8 kg 131.2 kg       Vital Signs:    No data found.      ALLERGY    ALLERGIES:  No Known Allergies    Home Medications :     Prescriptions Prior to Admission   Medication Sig Dispense Refill   • warfarin (COUMADIN) 4 MG tablet Take 4 mg by mouth daily. Take with (2) 1 mg tablets for a total dose of (= 6 mg)     • warfarin (COUMADIN) 1 MG tablet Take 2  mg by mouth daily. Dose: 2 tabs (= 2 mg)  Take with (1) 4 mg tablet for a total dose of (= 6 mg)     • fluocinonide (LIDEX) 0.05 % cream Apply 1 application topically 2 times daily as needed (Dermatitis). Apply to affected area(s)     • [DISCONTINUED] verapamil (CALAN SR, ISPOTIN SR) 240 MG CR tablet Take 240 mg by mouth nightly.     • [DISCONTINUED] furosemide (LASIX) 40 MG tablet Take 40 mg by mouth daily.     • [DISCONTINUED] glipiZIDE (GLUCOTROL) 5 MG tablet Take 2.5 mg by mouth daily (before breakfast). Dose: 1/2 Tab (= 2.5 mg)     • [DISCONTINUED] lisinopril (ZESTRIL) 10 MG tablet Take 10 mg by mouth daily.           medications / infusions     • metoPROLOL  75 mg Per NG tube 2 times per day   • aspirin  81 mg Per NG tube Daily   • loratadine  10 mg Per NG tube QAM AC   • WARFARIN - PHYSICIAN MONITORED 1 each  1 each Does not apply Q Evening   • lisinopril  20 mg Per NG tube Daily   • famotidine  20 mg Per NG tube 2 times per day   • atorvastatin  80 mg Per NG tube Nightly   • docusate sodium  200 mg Per NG tube Daily   • insulin regular (human)   Subcutaneous 4 times per day   • sodium chloride (PF)  2 mL Injection 2 times per day   • heparin (porcine)  5,000 Units Subcutaneous 3 times per day     • dextrose 5 % infusion           PHYSICAL EXAM  GENERAL : Alert. No Respiratory Distress  HENT: Normocephalic, atraumatic, Sclerae anicteric. Oral mucosa moist. Pupils are equally round and reactive to light.   NECK: No palpable nodes or mass. No bruits heard.   CARDIAC: S1, S2, regular. No S3, S4.   LUNGS: CTA bilaterally, diminished  ABDOMEN: soft, non-tender, non-distended, BS +, No guarding or rebound  MUSCULOSKELETAL: No lower extremity edema.   NEURO: Left hemiplegia       Blood Sugar Review        Recent Labs  Lab 09/04/17  1757 09/05/17  0007 09/05/17  0530 09/05/17  1214   GLUCOSE BEDSIDE 137* 152* 148* 173*       LABS      Recent Labs  Lab 09/05/17  0715 09/04/17  0520 09/03/17  0820 09/03/17  0449   09/01/17  0940  08/31/17  0834   WBC 8.6 8.9  --  8.1  < >  --   < >  --    HCT 40.6 39.7  --  39.3  < >  --   < >  --    HGB 13.0 13.0  --  12.9  < >  --   < >  --     375  --  384  < >  --   < >  --    INR  --   --  2.0  --   --  1.9  --  1.7   SODIUM 140 142  --  140  < >  --   < >  --    POTASSIUM 4.4 4.0  --  4.4  < >  --   < >  --    CHLORIDE 106 105  --  106  < >  --   < >  --    CO2 30 29  --  28  < >  --   < >  --    CALCIUM 9.0 8.6  --  8.6  < >  --   < >  --    GLUCOSE 161* 166*  --  162*  < >  --   < >  --    BUN 31* 30*  --  29*  < >  --   < >  --    CREATININE 0.48* 0.53  --  0.48*  < >  --   < >  --    AST 63* 52*  --  50*  < >  --   < >  --    GPT 85* 72  --  75  < >  --   < >  --    ALKPT 87 89  --  87  < >  --   < >  --    BILIRUBIN 0.6 0.6  --  0.5  < >  --   < >  --    ALBUMIN 2.2* 2.2*  --  2.3*  < >  --   < >  --    GFRNA 89 87  --  89  < >  --   < >  --    < > = values in this interval not displayed.           RADIOLOGY    Echo:    Normal left ventricular cavity size. No regional wall motion abnormalities.  Dilated IVC with normal respiratory variation.  Intermittent systolic flow reversals in hepatic veins.  Agitated saline was injected through a peripheral vein and did not show evidence of a shunt.    CT 9/3    IMPRESSION:  1.  No new intracranial abnormality.   2.  Evolving right frontal and right basal ganglia infarct.    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   Daughter asking if there is any medication to keep her more awake, Will add low dose Ritalin.  Pt is Hospice, but daughter is hopeful that she will turn around once she goes home.  Before I came back to discuss about meds at discharge daughter left.  EV Alvarado, palliative  care. She will discuss meds with her. Await Palliative care f/u tomorrow to finalize dc meds  Cont Captopril liquid, metoprolol liquid  Honey thick liquid with pureed diet  DC planned AM    Farhad Olvera MD  9/5/2017   Syncope

## 2018-04-19 ENCOUNTER — APPOINTMENT (OUTPATIENT)
Dept: FAMILY MEDICINE | Facility: CLINIC | Age: 83
End: 2018-04-19
Payer: MEDICARE

## 2018-04-19 VITALS — SYSTOLIC BLOOD PRESSURE: 140 MMHG | TEMPERATURE: 97.8 F | DIASTOLIC BLOOD PRESSURE: 80 MMHG

## 2018-04-19 DIAGNOSIS — I25.2 OLD MYOCARDIAL INFARCTION: ICD-10-CM

## 2018-04-19 DIAGNOSIS — Z95.5 PRESENCE OF CORONARY ANGIOPLASTY IMPLANT AND GRAFT: ICD-10-CM

## 2018-04-19 PROCEDURE — 99213 OFFICE O/P EST LOW 20 MIN: CPT

## 2018-04-19 RX ORDER — CLOPIDOGREL 75 MG/1
75 TABLET, FILM COATED ORAL
Refills: 0 | Status: ACTIVE | COMMUNITY

## 2018-06-26 ENCOUNTER — APPOINTMENT (OUTPATIENT)
Dept: FAMILY MEDICINE | Facility: CLINIC | Age: 83
End: 2018-06-26

## 2018-06-28 ENCOUNTER — APPOINTMENT (OUTPATIENT)
Dept: FAMILY MEDICINE | Facility: CLINIC | Age: 83
End: 2018-06-28
Payer: MEDICARE

## 2018-06-28 VITALS — SYSTOLIC BLOOD PRESSURE: 114 MMHG | DIASTOLIC BLOOD PRESSURE: 60 MMHG

## 2018-06-28 PROCEDURE — 99214 OFFICE O/P EST MOD 30 MIN: CPT

## 2018-06-28 NOTE — PHYSICAL EXAM
[No Acute Distress] : no acute distress [Supple] : supple [Clear to Auscultation] : lungs were clear to auscultation bilaterally [Regular Rhythm] : with a regular rhythm [Alert and Oriented x3] : oriented to person, place, and time [de-identified] : 2+edema of right foot and ankle [de-identified] : reddish discoloration of right foot and ankle [de-identified] : Parkinsons

## 2018-06-28 NOTE — HISTORY OF PRESENT ILLNESS
[FreeTextEntry8] : Pt presents to office accompanied by his wife for a right foot and ankle swelling for the last several days. 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 Patient states the foot got somewhat better but not 100%. Some pain and a bit of numbness in the first 2 toes. Does have some redness to both feet. She has a history of Parkinson disease.Denies CP or SOB

## 2018-06-28 NOTE — ASSESSMENT
[FreeTextEntry1] : Pt presents to office accompanied by his wife for a right foot and ankle swelling for the last several days. 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 Patient states the foot got somewhat better but not 100%. Some pain and a bit of numbness in the first 2 toes. Does have some redness to both feet. She has a history of Parkinson disease.Denies CP or SOB.Pt willrestart Lasix 40mg for 2 weeks\par Pt to RTO in 2-3 weeks for PE\par Will check labs and urinc acid\par Pt to elevated foot and wear support stockings

## 2018-06-28 NOTE — REVIEW OF SYSTEMS
[Lower Ext Edema] : lower extremity edema [Joint Pain] : joint pain [Negative] : Genitourinary [FreeTextEntry9] : pain in feet and ankle [de-identified] : redness of right foot and ankle .Not warm to touch [de-identified] : Parkinsons tremor

## 2018-07-17 ENCOUNTER — APPOINTMENT (OUTPATIENT)
Dept: FAMILY MEDICINE | Facility: CLINIC | Age: 83
End: 2018-07-17
Payer: MEDICARE

## 2018-07-17 VITALS
RESPIRATION RATE: 18 BRPM | OXYGEN SATURATION: 92 % | TEMPERATURE: 97.9 F | SYSTOLIC BLOOD PRESSURE: 130 MMHG | DIASTOLIC BLOOD PRESSURE: 70 MMHG | HEART RATE: 105 BPM

## 2018-07-17 DIAGNOSIS — R60.0 LOCALIZED EDEMA: ICD-10-CM

## 2018-07-17 DIAGNOSIS — I10 ESSENTIAL (PRIMARY) HYPERTENSION: ICD-10-CM

## 2018-07-17 DIAGNOSIS — G90.9 DISORDER OF THE AUTONOMIC NERVOUS SYSTEM, UNSPECIFIED: ICD-10-CM

## 2018-07-17 DIAGNOSIS — G90.3 MULTI-SYSTEM DEGENERATION OF THE AUTONOMIC NERVOUS SYSTEM: ICD-10-CM

## 2018-07-17 DIAGNOSIS — Z00.00 ENCOUNTER FOR GENERAL ADULT MEDICAL EXAMINATION W/OUT ABNORMAL FINDINGS: ICD-10-CM

## 2018-07-17 PROCEDURE — 99397 PER PM REEVAL EST PAT 65+ YR: CPT

## 2018-07-17 NOTE — ASSESSMENT
[FreeTextEntry1] : Pt presents to office for routine PE.Pt has hx of Parkinsons and depression.Pt sees neuro and Cardio.Saw Cardio 2 months ago. Has been on LAsix for 1 month for leg edema. Pt states he still has ankle and foot edema but better. Pt still having hallucinations but better .Pt on Seroquel for sleep but still has issues with insomnia. \par Labs reviwed Normal\par Will add Vit B12 1000mg daily\par pt will taper off Lasix for 2 weeks three times weekly then twice weekly then d/c and take prn\par Referred for Shingrix

## 2018-07-17 NOTE — HISTORY OF PRESENT ILLNESS
[de-identified] : Pt presents to office for routine PE.Pt has hx of Parkinsons and depression.Pt sees neuro and Cardio.Saw Cardio 2 months ago. Has been on LAsix for 1 month for leg edema. Pt states he still has ankle and foot edema but better. Pt still having hallucinations but better .Pt on Seroquel for sleep but still has issues with insomnia.

## 2018-07-17 NOTE — PHYSICAL EXAM
[No Acute Distress] : no acute distress [Well Nourished] : well nourished [Well Developed] : well developed [Well-Appearing] : well-appearing [Normal Sclera/Conjunctiva] : normal sclera/conjunctiva [PERRL] : pupils equal round and reactive to light [EOMI] : extraocular movements intact [Normal Outer Ear/Nose] : the outer ears and nose were normal in appearance [Normal Oropharynx] : the oropharynx was normal [No JVD] : no jugular venous distention [Supple] : supple [No Lymphadenopathy] : no lymphadenopathy [Thyroid Normal, No Nodules] : the thyroid was normal and there were no nodules present [No Respiratory Distress] : no respiratory distress  [Clear to Auscultation] : lungs were clear to auscultation bilaterally [No Accessory Muscle Use] : no accessory muscle use [Normal Rate] : normal rate  [Regular Rhythm] : with a regular rhythm [Normal S1, S2] : normal S1 and S2 [No Murmur] : no murmur heard [No Carotid Bruits] : no carotid bruits [No Abdominal Bruit] : a ~M bruit was not heard ~T in the abdomen [No Varicosities] : no varicosities [Pedal Pulses Present] : the pedal pulses are present [No Extremity Clubbing/Cyanosis] : no extremity clubbing/cyanosis [No Palpable Aorta] : no palpable aorta [Soft] : abdomen soft [Non Tender] : non-tender [Non-distended] : non-distended [No Masses] : no abdominal mass palpated [No HSM] : no HSM [Normal Bowel Sounds] : normal bowel sounds [Normal Posterior Cervical Nodes] : no posterior cervical lymphadenopathy [Normal Anterior Cervical Nodes] : no anterior cervical lymphadenopathy [No CVA Tenderness] : no CVA  tenderness [No Spinal Tenderness] : no spinal tenderness [No Joint Swelling] : no joint swelling [Grossly Normal Strength/Tone] : grossly normal strength/tone [No Rash] : no rash [Alert and Oriented x3] : oriented to person, place, and time [de-identified] : 1+pitting edema left > right [de-identified] : Parkinsons tremor and facies [de-identified] : depressed affect and mood

## 2018-07-17 NOTE — REVIEW OF SYSTEMS
[Fatigue] : fatigue [Lower Ext Edema] : lower extremity edema [Insomnia] : insomnia [Anxiety] : anxiety [Depression] : depression [Negative] : Heme/Lymph [de-identified] : Parkinsons tremor and facies [de-identified] : hallucnations

## 2018-09-12 ENCOUNTER — APPOINTMENT (OUTPATIENT)
Dept: FAMILY MEDICINE | Facility: CLINIC | Age: 83
End: 2018-09-12
Payer: MEDICARE

## 2018-09-12 VITALS
HEART RATE: 69 BPM | DIASTOLIC BLOOD PRESSURE: 68 MMHG | OXYGEN SATURATION: 94 % | SYSTOLIC BLOOD PRESSURE: 142 MMHG | TEMPERATURE: 98.1 F

## 2018-09-12 DIAGNOSIS — R11.0 NAUSEA: ICD-10-CM

## 2018-09-12 DIAGNOSIS — R34 ANURIA AND OLIGURIA: ICD-10-CM

## 2018-09-12 DIAGNOSIS — G20 PARKINSON'S DISEASE: ICD-10-CM

## 2018-09-12 DIAGNOSIS — R41.82 ALTERED MENTAL STATUS, UNSPECIFIED: ICD-10-CM

## 2018-09-12 DIAGNOSIS — R63.0 ANOREXIA: ICD-10-CM

## 2018-09-12 PROCEDURE — 36415 COLL VENOUS BLD VENIPUNCTURE: CPT

## 2018-09-12 PROCEDURE — 99215 OFFICE O/P EST HI 40 MIN: CPT | Mod: 25

## 2018-09-12 PROCEDURE — 81003 URINALYSIS AUTO W/O SCOPE: CPT | Mod: QW

## 2018-09-12 RX ORDER — PIMAVANSERIN TARTRATE 34 MG/1
34 CAPSULE ORAL
Refills: 0 | Status: ACTIVE | COMMUNITY

## 2018-09-12 NOTE — HISTORY OF PRESENT ILLNESS
[FreeTextEntry8] : Patient presents to the office accompanied by his wife for 6 day history of confusion, decreased appetite, nausea and decreased urination. Denies any injury or trauma. No fever..  Patient has not been placed on any new medications. Has been on no present which was doubled several months ago for visual hallucinations. According to patient's wife patient is still having some visual hallucinations\par \par

## 2018-09-12 NOTE — REVIEW OF SYSTEMS
[Recent Change In Weight] : ~T recent weight change [Confusion] : confusion [Depression] : depression [Negative] : Respiratory [FreeTextEntry8] : decreased urination

## 2018-09-12 NOTE — ASSESSMENT
[FreeTextEntry1] : Patient presents to the office accompanied by his wife for 6 day history of confusion, decreased appetite, nausea and decreased urination. Denies any injury or trauma. No fever..  Patient has not been placed on any new medications. Has been on no present which was doubled several months ago for visual hallucinations. According to patient's wife patient is still having some visual hallucinations\par \par Will check labs today and UA.\par Willdiscuss with Neurologist if labs normal\par UA dip + 1 prot,Ket +1\par

## 2018-09-12 NOTE — PHYSICAL EXAM
[Clear to Auscultation] : lungs were clear to auscultation bilaterally [Regular Rhythm] : with a regular rhythm [Non Tender] : non-tender [de-identified] : frail appearing elderly male [de-identified] : wheelchair [de-identified] : not oriented to place or time

## 2018-09-14 LAB
ALBUMIN SERPL ELPH-MCNC: 4.4 G/DL
ALP BLD-CCNC: 94 U/L
ALT SERPL-CCNC: 6 U/L
ANION GAP SERPL CALC-SCNC: 15 MMOL/L
AST SERPL-CCNC: 14 U/L
BILIRUB SERPL-MCNC: 1.5 MG/DL
BUN SERPL-MCNC: 24 MG/DL
CALCIUM SERPL-MCNC: 9.7 MG/DL
CHLORIDE SERPL-SCNC: 104 MMOL/L
CO2 SERPL-SCNC: 23 MMOL/L
CREAT SERPL-MCNC: 1.42 MG/DL
GLUCOSE SERPL-MCNC: 99 MG/DL
POTASSIUM SERPL-SCNC: 3.9 MMOL/L
PROT SERPL-MCNC: 7.5 G/DL
SODIUM SERPL-SCNC: 142 MMOL/L
VIT B12 SERPL-MCNC: 552 PG/ML

## 2018-09-25 ENCOUNTER — APPOINTMENT (OUTPATIENT)
Dept: FAMILY MEDICINE | Facility: CLINIC | Age: 83
End: 2018-09-25

## 2018-10-09 ENCOUNTER — MEDICATION RENEWAL (OUTPATIENT)
Age: 83
End: 2018-10-09

## 2018-10-09 DIAGNOSIS — K21.9 GASTRO-ESOPHAGEAL REFLUX DISEASE W/OUT ESOPHAGITIS: ICD-10-CM

## 2018-10-09 RX ORDER — FAMOTIDINE 40 MG/1
40 TABLET, FILM COATED ORAL
Qty: 60 | Refills: 1 | Status: ACTIVE | COMMUNITY
Start: 2018-10-09 | End: 1900-01-01

## 2018-10-09 RX ORDER — FUROSEMIDE 40 MG/1
40 TABLET ORAL
Qty: 30 | Refills: 2 | Status: ACTIVE | COMMUNITY
Start: 2018-06-28 | End: 1900-01-01

## 2018-10-30 ENCOUNTER — APPOINTMENT (OUTPATIENT)
Dept: FAMILY MEDICINE | Facility: CLINIC | Age: 83
End: 2018-10-30

## 2018-12-15 ENCOUNTER — INPATIENT (INPATIENT)
Facility: HOSPITAL | Age: 83
LOS: 6 days | DRG: 536 | End: 2018-12-22
Attending: INTERNAL MEDICINE | Admitting: STUDENT IN AN ORGANIZED HEALTH CARE EDUCATION/TRAINING PROGRAM
Payer: OTHER MISCELLANEOUS

## 2018-12-15 VITALS
RESPIRATION RATE: 16 BRPM | HEART RATE: 71 BPM | OXYGEN SATURATION: 95 % | SYSTOLIC BLOOD PRESSURE: 125 MMHG | DIASTOLIC BLOOD PRESSURE: 72 MMHG

## 2018-12-15 DIAGNOSIS — Z98.89 OTHER SPECIFIED POSTPROCEDURAL STATES: Chronic | ICD-10-CM

## 2018-12-15 DIAGNOSIS — Z95.1 PRESENCE OF AORTOCORONARY BYPASS GRAFT: Chronic | ICD-10-CM

## 2018-12-15 PROCEDURE — 99285 EMERGENCY DEPT VISIT HI MDM: CPT | Mod: 25

## 2018-12-15 RX ORDER — MORPHINE SULFATE 50 MG/1
2 CAPSULE, EXTENDED RELEASE ORAL ONCE
Qty: 0 | Refills: 0 | Status: DISCONTINUED | OUTPATIENT
Start: 2018-12-15 | End: 2018-12-15

## 2018-12-15 NOTE — ED ADULT TRIAGE NOTE - DIRECT TO ROOM CARE INITIATED:
Airway  Urgency: elective    Date/Time: 2/2/2017 4:46 PM  End Time:2/2/2017 4:46 PM  Airway not difficult    General Information and Staff    Patient location during procedure: OR  CRNA: GORDO JOHNSON    Indications and Patient Condition  Indications for airway management: airway protection    Preoxygenated: yes  MILS maintained throughout  Mask difficulty assessment: 1 - vent by mask    Final Airway Details  Final airway type: endotracheal airway      Successful airway: ETT  Cuffed: yes   Successful intubation technique: direct laryngoscopy  Endotracheal tube insertion site: oral  Blade: Boyd  Blade size: #2  ETT size: 7.0 mm  Cormack-Lehane Classification: grade I - full view of glottis  Placement verified by: chest auscultation and capnometry   Cuff volume (mL): 8  Measured from: lips  ETT to lips (cm): 18  Number of attempts at approach: 1    Additional Comments  Atraumatic              15-Dec-2018 23:17

## 2018-12-16 DIAGNOSIS — Z71.89 OTHER SPECIFIED COUNSELING: ICD-10-CM

## 2018-12-16 DIAGNOSIS — Z51.5 ENCOUNTER FOR PALLIATIVE CARE: ICD-10-CM

## 2018-12-16 DIAGNOSIS — S32.401A UNSPECIFIED FRACTURE OF RIGHT ACETABULUM, INITIAL ENCOUNTER FOR CLOSED FRACTURE: ICD-10-CM

## 2018-12-16 DIAGNOSIS — I25.10 ATHEROSCLEROTIC HEART DISEASE OF NATIVE CORONARY ARTERY WITHOUT ANGINA PECTORIS: ICD-10-CM

## 2018-12-16 DIAGNOSIS — Z29.9 ENCOUNTER FOR PROPHYLACTIC MEASURES, UNSPECIFIED: ICD-10-CM

## 2018-12-16 DIAGNOSIS — D72.829 ELEVATED WHITE BLOOD CELL COUNT, UNSPECIFIED: ICD-10-CM

## 2018-12-16 DIAGNOSIS — N17.9 ACUTE KIDNEY FAILURE, UNSPECIFIED: ICD-10-CM

## 2018-12-16 DIAGNOSIS — G20 PARKINSON'S DISEASE: ICD-10-CM

## 2018-12-16 LAB
ALBUMIN SERPL ELPH-MCNC: 3.3 G/DL — SIGNIFICANT CHANGE UP (ref 3.3–5)
ALBUMIN SERPL ELPH-MCNC: 3.7 G/DL — SIGNIFICANT CHANGE UP (ref 3.3–5)
ALP SERPL-CCNC: 75 U/L — SIGNIFICANT CHANGE UP (ref 40–120)
ALP SERPL-CCNC: 90 U/L — SIGNIFICANT CHANGE UP (ref 40–120)
ALT FLD-CCNC: 7 U/L — LOW (ref 10–45)
ALT FLD-CCNC: 9 U/L — LOW (ref 10–45)
ANION GAP SERPL CALC-SCNC: 15 MMOL/L — SIGNIFICANT CHANGE UP (ref 5–17)
ANION GAP SERPL CALC-SCNC: 16 MMOL/L — SIGNIFICANT CHANGE UP (ref 5–17)
AST SERPL-CCNC: 15 U/L — SIGNIFICANT CHANGE UP (ref 10–40)
AST SERPL-CCNC: 16 U/L — SIGNIFICANT CHANGE UP (ref 10–40)
BASOPHILS # BLD AUTO: 0.1 K/UL — SIGNIFICANT CHANGE UP (ref 0–0.2)
BASOPHILS # BLD AUTO: 0.14 K/UL — SIGNIFICANT CHANGE UP (ref 0–0.2)
BASOPHILS NFR BLD AUTO: 0.7 % — SIGNIFICANT CHANGE UP (ref 0–2)
BASOPHILS NFR BLD AUTO: 1 % — SIGNIFICANT CHANGE UP (ref 0–2)
BILIRUB SERPL-MCNC: 0.6 MG/DL — SIGNIFICANT CHANGE UP (ref 0.2–1.2)
BILIRUB SERPL-MCNC: 0.7 MG/DL — SIGNIFICANT CHANGE UP (ref 0.2–1.2)
BUN SERPL-MCNC: 29 MG/DL — HIGH (ref 7–23)
BUN SERPL-MCNC: 36 MG/DL — HIGH (ref 7–23)
CALCIUM SERPL-MCNC: 8.7 MG/DL — SIGNIFICANT CHANGE UP (ref 8.4–10.5)
CALCIUM SERPL-MCNC: 8.8 MG/DL — SIGNIFICANT CHANGE UP (ref 8.4–10.5)
CHLORIDE SERPL-SCNC: 104 MMOL/L — SIGNIFICANT CHANGE UP (ref 96–108)
CHLORIDE SERPL-SCNC: 104 MMOL/L — SIGNIFICANT CHANGE UP (ref 96–108)
CO2 SERPL-SCNC: 23 MMOL/L — SIGNIFICANT CHANGE UP (ref 22–31)
CO2 SERPL-SCNC: 23 MMOL/L — SIGNIFICANT CHANGE UP (ref 22–31)
CREAT SERPL-MCNC: 1.45 MG/DL — HIGH (ref 0.5–1.3)
CREAT SERPL-MCNC: 1.89 MG/DL — HIGH (ref 0.5–1.3)
EOSINOPHIL # BLD AUTO: 0 K/UL — SIGNIFICANT CHANGE UP (ref 0–0.5)
EOSINOPHIL # BLD AUTO: 0.1 K/UL — SIGNIFICANT CHANGE UP (ref 0–0.5)
EOSINOPHIL NFR BLD AUTO: 0 % — SIGNIFICANT CHANGE UP (ref 0–6)
EOSINOPHIL NFR BLD AUTO: 0.7 % — SIGNIFICANT CHANGE UP (ref 0–6)
GLUCOSE SERPL-MCNC: 141 MG/DL — HIGH (ref 70–99)
GLUCOSE SERPL-MCNC: 151 MG/DL — HIGH (ref 70–99)
HCT VFR BLD CALC: 29.7 % — LOW (ref 39–50)
HCT VFR BLD CALC: 36.6 % — LOW (ref 39–50)
HGB BLD-MCNC: 12.2 G/DL — LOW (ref 13–17)
HGB BLD-MCNC: 9.8 G/DL — LOW (ref 13–17)
LYMPHOCYTES # BLD AUTO: 0.98 K/UL — LOW (ref 1–3.3)
LYMPHOCYTES # BLD AUTO: 1.9 K/UL — SIGNIFICANT CHANGE UP (ref 1–3.3)
LYMPHOCYTES # BLD AUTO: 10.1 % — LOW (ref 13–44)
LYMPHOCYTES # BLD AUTO: 7 % — LOW (ref 13–44)
MAGNESIUM SERPL-MCNC: 2 MG/DL — SIGNIFICANT CHANGE UP (ref 1.6–2.6)
MCHC RBC-ENTMCNC: 32 PG — SIGNIFICANT CHANGE UP (ref 27–34)
MCHC RBC-ENTMCNC: 32.1 PG — SIGNIFICANT CHANGE UP (ref 27–34)
MCHC RBC-ENTMCNC: 33 GM/DL — SIGNIFICANT CHANGE UP (ref 32–36)
MCHC RBC-ENTMCNC: 33.5 GM/DL — SIGNIFICANT CHANGE UP (ref 32–36)
MCV RBC AUTO: 95.5 FL — SIGNIFICANT CHANGE UP (ref 80–100)
MCV RBC AUTO: 97.4 FL — SIGNIFICANT CHANGE UP (ref 80–100)
MONOCYTES # BLD AUTO: 1 K/UL — HIGH (ref 0–0.9)
MONOCYTES # BLD AUTO: 1.25 K/UL — HIGH (ref 0–0.9)
MONOCYTES NFR BLD AUTO: 5.2 % — SIGNIFICANT CHANGE UP (ref 2–14)
MONOCYTES NFR BLD AUTO: 9 % — SIGNIFICANT CHANGE UP (ref 2–14)
NEUTROPHILS # BLD AUTO: 11.57 K/UL — HIGH (ref 1.8–7.4)
NEUTROPHILS # BLD AUTO: 16.1 K/UL — HIGH (ref 1.8–7.4)
NEUTROPHILS NFR BLD AUTO: 83 % — HIGH (ref 43–77)
NEUTROPHILS NFR BLD AUTO: 83.3 % — HIGH (ref 43–77)
PHOSPHATE SERPL-MCNC: 3.9 MG/DL — SIGNIFICANT CHANGE UP (ref 2.5–4.5)
PLATELET # BLD AUTO: 140 K/UL — LOW (ref 150–400)
PLATELET # BLD AUTO: 196 K/UL — SIGNIFICANT CHANGE UP (ref 150–400)
POTASSIUM SERPL-MCNC: 4.1 MMOL/L — SIGNIFICANT CHANGE UP (ref 3.5–5.3)
POTASSIUM SERPL-MCNC: 4.5 MMOL/L — SIGNIFICANT CHANGE UP (ref 3.5–5.3)
POTASSIUM SERPL-SCNC: 4.1 MMOL/L — SIGNIFICANT CHANGE UP (ref 3.5–5.3)
POTASSIUM SERPL-SCNC: 4.5 MMOL/L — SIGNIFICANT CHANGE UP (ref 3.5–5.3)
PROT SERPL-MCNC: 6.1 G/DL — SIGNIFICANT CHANGE UP (ref 6–8.3)
PROT SERPL-MCNC: 6.7 G/DL — SIGNIFICANT CHANGE UP (ref 6–8.3)
RBC # BLD: 3.05 M/UL — LOW (ref 4.2–5.8)
RBC # BLD: 3.83 M/UL — LOW (ref 4.2–5.8)
RBC # FLD: 15.3 % — HIGH (ref 10.3–14.5)
RBC # FLD: 17.1 % — HIGH (ref 10.3–14.5)
SODIUM SERPL-SCNC: 142 MMOL/L — SIGNIFICANT CHANGE UP (ref 135–145)
SODIUM SERPL-SCNC: 143 MMOL/L — SIGNIFICANT CHANGE UP (ref 135–145)
WBC # BLD: 13.94 K/UL — HIGH (ref 3.8–10.5)
WBC # BLD: 19.3 K/UL — HIGH (ref 3.8–10.5)
WBC # FLD AUTO: 13.94 K/UL — HIGH (ref 3.8–10.5)
WBC # FLD AUTO: 19.3 K/UL — HIGH (ref 3.8–10.5)

## 2018-12-16 PROCEDURE — 70450 CT HEAD/BRAIN W/O DYE: CPT | Mod: 26

## 2018-12-16 PROCEDURE — 72192 CT PELVIS W/O DYE: CPT | Mod: 26

## 2018-12-16 PROCEDURE — 71045 X-RAY EXAM CHEST 1 VIEW: CPT | Mod: 26

## 2018-12-16 PROCEDURE — 72170 X-RAY EXAM OF PELVIS: CPT | Mod: 26,59,GV

## 2018-12-16 PROCEDURE — 73552 X-RAY EXAM OF FEMUR 2/>: CPT | Mod: 26,RT

## 2018-12-16 PROCEDURE — 99223 1ST HOSP IP/OBS HIGH 75: CPT | Mod: GC

## 2018-12-16 PROCEDURE — 99497 ADVNCD CARE PLAN 30 MIN: CPT | Mod: 25

## 2018-12-16 PROCEDURE — 99223 1ST HOSP IP/OBS HIGH 75: CPT

## 2018-12-16 PROCEDURE — 73502 X-RAY EXAM HIP UNI 2-3 VIEWS: CPT | Mod: 26,RT

## 2018-12-16 PROCEDURE — 76377 3D RENDER W/INTRP POSTPROCES: CPT | Mod: 26

## 2018-12-16 PROCEDURE — 72125 CT NECK SPINE W/O DYE: CPT | Mod: 26

## 2018-12-16 RX ORDER — ACETAMINOPHEN 500 MG
1000 TABLET ORAL ONCE
Qty: 0 | Refills: 0 | Status: COMPLETED | OUTPATIENT
Start: 2018-12-16 | End: 2018-12-16

## 2018-12-16 RX ORDER — ATENOLOL 25 MG/1
1 TABLET ORAL
Qty: 0 | Refills: 0 | COMMUNITY

## 2018-12-16 RX ORDER — PREGABALIN 225 MG/1
1 CAPSULE ORAL
Qty: 0 | Refills: 0 | COMMUNITY

## 2018-12-16 RX ORDER — HEPARIN SODIUM 5000 [USP'U]/ML
5000 INJECTION INTRAVENOUS; SUBCUTANEOUS EVERY 8 HOURS
Qty: 0 | Refills: 0 | Status: DISCONTINUED | OUTPATIENT
Start: 2018-12-16 | End: 2018-12-16

## 2018-12-16 RX ORDER — CLOPIDOGREL BISULFATE 75 MG/1
75 TABLET, FILM COATED ORAL DAILY
Qty: 0 | Refills: 0 | Status: DISCONTINUED | OUTPATIENT
Start: 2018-12-16 | End: 2018-12-18

## 2018-12-16 RX ORDER — ATENOLOL 25 MG/1
50 TABLET ORAL DAILY
Qty: 0 | Refills: 0 | Status: DISCONTINUED | OUTPATIENT
Start: 2018-12-16 | End: 2018-12-18

## 2018-12-16 RX ORDER — FUROSEMIDE 40 MG
1 TABLET ORAL
Qty: 0 | Refills: 0 | COMMUNITY

## 2018-12-16 RX ORDER — SODIUM CHLORIDE 9 MG/ML
1000 INJECTION INTRAMUSCULAR; INTRAVENOUS; SUBCUTANEOUS
Qty: 0 | Refills: 0 | Status: DISCONTINUED | OUTPATIENT
Start: 2018-12-16 | End: 2018-12-16

## 2018-12-16 RX ORDER — CHOLECALCIFEROL (VITAMIN D3) 125 MCG
1 CAPSULE ORAL
Qty: 0 | Refills: 0 | COMMUNITY

## 2018-12-16 RX ORDER — PREGABALIN 225 MG/1
1000 CAPSULE ORAL DAILY
Qty: 0 | Refills: 0 | Status: DISCONTINUED | OUTPATIENT
Start: 2018-12-16 | End: 2018-12-18

## 2018-12-16 RX ORDER — ATORVASTATIN CALCIUM 80 MG/1
40 TABLET, FILM COATED ORAL AT BEDTIME
Qty: 0 | Refills: 0 | Status: DISCONTINUED | OUTPATIENT
Start: 2018-12-16 | End: 2018-12-18

## 2018-12-16 RX ORDER — HYDROMORPHONE HYDROCHLORIDE 2 MG/ML
0.25 INJECTION INTRAMUSCULAR; INTRAVENOUS; SUBCUTANEOUS EVERY 4 HOURS
Qty: 0 | Refills: 0 | Status: DISCONTINUED | OUTPATIENT
Start: 2018-12-16 | End: 2018-12-16

## 2018-12-16 RX ORDER — CARBIDOPA AND LEVODOPA 25; 100 MG/1; MG/1
1 TABLET ORAL
Qty: 0 | Refills: 0 | COMMUNITY

## 2018-12-16 RX ORDER — QUETIAPINE FUMARATE 200 MG/1
1 TABLET, FILM COATED ORAL
Qty: 0 | Refills: 0 | COMMUNITY

## 2018-12-16 RX ORDER — FOLIC ACID 0.8 MG
1 TABLET ORAL DAILY
Qty: 0 | Refills: 0 | Status: DISCONTINUED | OUTPATIENT
Start: 2018-12-16 | End: 2018-12-18

## 2018-12-16 RX ORDER — QUETIAPINE FUMARATE 200 MG/1
25 TABLET, FILM COATED ORAL AT BEDTIME
Qty: 0 | Refills: 0 | Status: DISCONTINUED | OUTPATIENT
Start: 2018-12-16 | End: 2018-12-18

## 2018-12-16 RX ORDER — HYDROMORPHONE HYDROCHLORIDE 2 MG/ML
0.5 INJECTION INTRAMUSCULAR; INTRAVENOUS; SUBCUTANEOUS ONCE
Qty: 0 | Refills: 0 | Status: DISCONTINUED | OUTPATIENT
Start: 2018-12-16 | End: 2018-12-16

## 2018-12-16 RX ORDER — FOLIC ACID 0.8 MG
1 TABLET ORAL
Qty: 0 | Refills: 0 | COMMUNITY

## 2018-12-16 RX ORDER — FAMOTIDINE 10 MG/ML
1 INJECTION INTRAVENOUS
Qty: 0 | Refills: 0 | COMMUNITY

## 2018-12-16 RX ORDER — CARBIDOPA AND LEVODOPA 25; 100 MG/1; MG/1
1 TABLET ORAL
Qty: 0 | Refills: 0 | Status: DISCONTINUED | OUTPATIENT
Start: 2018-12-16 | End: 2018-12-18

## 2018-12-16 RX ORDER — OMEPRAZOLE 10 MG/1
1 CAPSULE, DELAYED RELEASE ORAL
Qty: 0 | Refills: 0 | COMMUNITY

## 2018-12-16 RX ORDER — MORPHINE SULFATE 50 MG/1
2 CAPSULE, EXTENDED RELEASE ORAL ONCE
Qty: 0 | Refills: 0 | Status: DISCONTINUED | OUTPATIENT
Start: 2018-12-16 | End: 2018-12-16

## 2018-12-16 RX ORDER — ACETAMINOPHEN 500 MG
650 TABLET ORAL EVERY 6 HOURS
Qty: 0 | Refills: 0 | Status: DISCONTINUED | OUTPATIENT
Start: 2018-12-16 | End: 2018-12-18

## 2018-12-16 RX ORDER — DOCUSATE SODIUM 100 MG
100 CAPSULE ORAL
Qty: 0 | Refills: 0 | Status: DISCONTINUED | OUTPATIENT
Start: 2018-12-16 | End: 2018-12-18

## 2018-12-16 RX ORDER — PIMAVANSERIN TARTRATE 10 MG/1
1 TABLET, COATED ORAL
Qty: 0 | Refills: 0 | COMMUNITY

## 2018-12-16 RX ORDER — FAMOTIDINE 10 MG/ML
20 INJECTION INTRAVENOUS DAILY
Qty: 0 | Refills: 0 | Status: DISCONTINUED | OUTPATIENT
Start: 2018-12-16 | End: 2018-12-18

## 2018-12-16 RX ORDER — HYDROMORPHONE HYDROCHLORIDE 2 MG/ML
0.5 INJECTION INTRAMUSCULAR; INTRAVENOUS; SUBCUTANEOUS
Qty: 0 | Refills: 0 | Status: DISCONTINUED | OUTPATIENT
Start: 2018-12-16 | End: 2018-12-18

## 2018-12-16 RX ORDER — SODIUM CHLORIDE 9 MG/ML
1000 INJECTION INTRAMUSCULAR; INTRAVENOUS; SUBCUTANEOUS
Qty: 0 | Refills: 0 | Status: DISCONTINUED | OUTPATIENT
Start: 2018-12-16 | End: 2018-12-17

## 2018-12-16 RX ORDER — HYDROMORPHONE HYDROCHLORIDE 2 MG/ML
0.5 INJECTION INTRAMUSCULAR; INTRAVENOUS; SUBCUTANEOUS EVERY 4 HOURS
Qty: 0 | Refills: 0 | Status: DISCONTINUED | OUTPATIENT
Start: 2018-12-16 | End: 2018-12-16

## 2018-12-16 RX ORDER — CARBIDOPA AND LEVODOPA 25; 100 MG/1; MG/1
1.5 TABLET ORAL
Qty: 0 | Refills: 0 | Status: DISCONTINUED | OUTPATIENT
Start: 2018-12-16 | End: 2018-12-18

## 2018-12-16 RX ORDER — FINASTERIDE 5 MG/1
5 TABLET, FILM COATED ORAL DAILY
Qty: 0 | Refills: 0 | Status: DISCONTINUED | OUTPATIENT
Start: 2018-12-16 | End: 2018-12-18

## 2018-12-16 RX ORDER — CLOPIDOGREL BISULFATE 75 MG/1
1 TABLET, FILM COATED ORAL
Qty: 0 | Refills: 0 | COMMUNITY

## 2018-12-16 RX ORDER — KETOROLAC TROMETHAMINE 30 MG/ML
15 SYRINGE (ML) INJECTION ONCE
Qty: 0 | Refills: 0 | Status: DISCONTINUED | OUTPATIENT
Start: 2018-12-16 | End: 2018-12-16

## 2018-12-16 RX ORDER — ASPIRIN/CALCIUM CARB/MAGNESIUM 324 MG
81 TABLET ORAL DAILY
Qty: 0 | Refills: 0 | Status: DISCONTINUED | OUTPATIENT
Start: 2018-12-16 | End: 2018-12-18

## 2018-12-16 RX ADMIN — Medication 15 MILLIGRAM(S): at 03:00

## 2018-12-16 RX ADMIN — HEPARIN SODIUM 5000 UNIT(S): 5000 INJECTION INTRAVENOUS; SUBCUTANEOUS at 08:16

## 2018-12-16 RX ADMIN — MORPHINE SULFATE 2 MILLIGRAM(S): 50 CAPSULE, EXTENDED RELEASE ORAL at 01:08

## 2018-12-16 RX ADMIN — HYDROMORPHONE HYDROCHLORIDE 0.5 MILLIGRAM(S): 2 INJECTION INTRAMUSCULAR; INTRAVENOUS; SUBCUTANEOUS at 10:06

## 2018-12-16 RX ADMIN — Medication 1000 MILLIGRAM(S): at 03:30

## 2018-12-16 RX ADMIN — MORPHINE SULFATE 2 MILLIGRAM(S): 50 CAPSULE, EXTENDED RELEASE ORAL at 01:30

## 2018-12-16 RX ADMIN — CARBIDOPA AND LEVODOPA 1 TABLET(S): 25; 100 TABLET ORAL at 22:26

## 2018-12-16 RX ADMIN — MORPHINE SULFATE 2 MILLIGRAM(S): 50 CAPSULE, EXTENDED RELEASE ORAL at 01:37

## 2018-12-16 RX ADMIN — ATORVASTATIN CALCIUM 40 MILLIGRAM(S): 80 TABLET, FILM COATED ORAL at 22:25

## 2018-12-16 RX ADMIN — CARBIDOPA AND LEVODOPA 1.5 TABLET(S): 25; 100 TABLET ORAL at 08:59

## 2018-12-16 RX ADMIN — HYDROMORPHONE HYDROCHLORIDE 0.5 MILLIGRAM(S): 2 INJECTION INTRAMUSCULAR; INTRAVENOUS; SUBCUTANEOUS at 17:55

## 2018-12-16 RX ADMIN — MORPHINE SULFATE 2 MILLIGRAM(S): 50 CAPSULE, EXTENDED RELEASE ORAL at 02:30

## 2018-12-16 RX ADMIN — QUETIAPINE FUMARATE 25 MILLIGRAM(S): 200 TABLET, FILM COATED ORAL at 22:22

## 2018-12-16 RX ADMIN — Medication 400 MILLIGRAM(S): at 02:56

## 2018-12-16 RX ADMIN — HYDROMORPHONE HYDROCHLORIDE 0.5 MILLIGRAM(S): 2 INJECTION INTRAMUSCULAR; INTRAVENOUS; SUBCUTANEOUS at 10:25

## 2018-12-16 RX ADMIN — Medication 100 MILLIGRAM(S): at 17:42

## 2018-12-16 RX ADMIN — CARBIDOPA AND LEVODOPA 1.5 TABLET(S): 25; 100 TABLET ORAL at 17:41

## 2018-12-16 RX ADMIN — HYDROMORPHONE HYDROCHLORIDE 0.5 MILLIGRAM(S): 2 INJECTION INTRAMUSCULAR; INTRAVENOUS; SUBCUTANEOUS at 02:55

## 2018-12-16 RX ADMIN — SODIUM CHLORIDE 75 MILLILITER(S): 9 INJECTION INTRAMUSCULAR; INTRAVENOUS; SUBCUTANEOUS at 07:35

## 2018-12-16 RX ADMIN — HYDROMORPHONE HYDROCHLORIDE 0.5 MILLIGRAM(S): 2 INJECTION INTRAMUSCULAR; INTRAVENOUS; SUBCUTANEOUS at 03:30

## 2018-12-16 RX ADMIN — SODIUM CHLORIDE 500 MILLILITER(S): 9 INJECTION INTRAMUSCULAR; INTRAVENOUS; SUBCUTANEOUS at 08:17

## 2018-12-16 RX ADMIN — Medication 15 MILLIGRAM(S): at 02:56

## 2018-12-16 RX ADMIN — HYDROMORPHONE HYDROCHLORIDE 0.5 MILLIGRAM(S): 2 INJECTION INTRAMUSCULAR; INTRAVENOUS; SUBCUTANEOUS at 17:41

## 2018-12-16 NOTE — H&P ADULT - PROBLEM SELECTOR PLAN 4
-c/w carbidopa  -c/w quetiapine with parameters for sedation  -Pt on Nuplazid (pimavanserin); suggested family bring in med  -HCN contacted by ER, to further discuss mgt and d/c planning on Monday

## 2018-12-16 NOTE — CONSULT NOTE ADULT - ASSESSMENT
87M w/ right acetabulum fracture  Analgesia  DVT ppx  Incentive spirometry  Toe Touch weight bearing RLE  P/T  Care per medical team  No acute surgical intervention  Repeat x-rays in 1 week  FU With Dr. Rodriguez as outpatient 7-10 days from discharge of hospital or rehab  Will d/w attending any further activities

## 2018-12-16 NOTE — CONSULT NOTE ADULT - SUBJECTIVE AND OBJECTIVE BOX
87yMale c/o R hip pain s/p mechanical fall. Patient denies head hit or LOC. Patient denies numbness or tingling in the RLE. Patient denies any other injuries.    PMH:  CAD (coronary artery disease)  Spondylolisthesis  Spinal stenosis of lumbar region  Parkinsons disease  Osteoporosis  Essential hypertension  BPH (benign prostatic hyperplasia)    PSH:  History of back surgery  S/P CABG x 1  History of loop recorder  History of hernia repair  No significant past surgical history    AH:  iodine (Unknown)    Meds: See med rec    T(C): --  HR: 71 (12-15-18 @ 23:16)  BP: 125/72 (12-15-18 @ 23:16)  RR: 16 (12-15-18 @ 23:16)  SpO2: 95% (12-15-18 @ 23:16)  Wt(kg): --    PE RLE:  Skin intact, no ecchymosis, SILT L2-S1, +EHL/FHL/TA/Gastroc, +Knee/ankle ROM, hip ROM limited 2/2 pain, DP+, soft compartments, no calf ttp, - log roll, pain with axial loading.    Secondary:  No TTP over bony landmarks, SILT BL, ROM intact BL, distal pulses palpable.    Imaging:  XR and CT demonstrating Right acetabulum fracture

## 2018-12-16 NOTE — ED PROVIDER NOTE - PHYSICAL EXAMINATION
Elizabeth Hoyt M.D.:   patient awake alert seen lying on stretcher in mild distress 2/2 right hip pain.   LUNGS CTAB no wheeze no crackle.   CARD RRR no m/r/g.    Abdomen soft NT ND no rebound no guarding no CVA tenderness.   EXT WWP no edema no calf tenderness CV 2+DP/PT bilaterally. ttp over right hip w/ RLE externally rotated. no midline c/t/l spine tenderness  neuro A&Ox3 gross motor and sensory intact in all extremities .    skin warm and dry no rash  HEENT: dry mucous membranes, PERRL, EOMI

## 2018-12-16 NOTE — ED PROVIDER NOTE - PROGRESS NOTE DETAILS
spoke with andrew [facility] ([phone]). explained that pt with pelvic fracture, pain not well controlled and feel pt would not do well going home. they understand and will set up for hospice liason to come see him in hospital on monday. pt endorsed to hospitalist dr payne.

## 2018-12-16 NOTE — PROGRESS NOTE ADULT - PROBLEM SELECTOR PLAN 2
-Afebrile, no signs/symptoms suggestive of infection.   -Likely reactive in setting of acute fracture  -To observe off antibiotics.   -Will repeat CBC, consider septic work up PRN if any change in hemodynamic status, fevers, not resolving leukocytosis. -Afebrile, no signs/symptoms suggestive of infection.   -Likely reactive in setting of acute fracture, downtrending  -To observe off antibiotics.   -if fever, will consider cultures at that time  -Will repeat CBC, consider septic work up PRN if any change in hemodynamic status, fevers, not resolving leukocytosis.

## 2018-12-16 NOTE — ED PROVIDER NOTE - MEDICAL DECISION MAKING DETAILS
87M parkinsons presenting after mechanical fall exam concerning for hip fracture. for labs xrays ct head/cspine, reassess. 87M parkinsons presenting after mechanical fall exam concerning for hip fracture. for labs xrays ct head/cspine, reassess.  ATTG: Dr. Collins

## 2018-12-16 NOTE — PROGRESS NOTE ADULT - PROBLEM SELECTOR PLAN 5
-c/w Aspirin  -c/w Plavix  -c/w Statin  -c/w Atenolol -c/w Aspirin  -c/w Plavix  -c/w Statin  -pending clinical course, can likely discuss with family discontinuation of non essential medication.  -c/w Atenolol

## 2018-12-16 NOTE — PHYSICAL THERAPY INITIAL EVALUATION ADULT - ADDITIONAL COMMENTS
As per pt's wife, pt lives with her in a apt with 0 steps. Pt's wife assists pt with all ADLs and ambulation. Pt uses a RW for short distance ambulation and a wheelchair for long distances. Pt with hx of falls. Pt owns a commode. Pt wears eyeglasses for distance and reading.

## 2018-12-16 NOTE — PROGRESS NOTE ADULT - SUBJECTIVE AND OBJECTIVE BOX
GAP TEAM PALLIATIVE CARE UNIT PROGRESS NOTE:      [ x] Patient on hospice program.    INDICATION FOR PALLIATIVE CARE UNIT SERVICES: acute illness management, symptom management s/p fall with right acetabular / pelvic fracture     INTERVAL HPI/OVERNIGHT EVENTS:  Patient seen and evaluated at bedside.   Sleeping but arousable- nonverbal, not able to follow commands.   Does not appear in any distress, noted bilateral upper extremity tremors.  Did not require any rescue dose of Dilaudid for pain.   Afebrile, but hypotensive this AM- which responded to 200cc IV fluid.  On admission- leukocytosis, but no other evidence of sepsis. Will monitor off antibiotics.   Per ortho- no surgical intervention offered, to continue PT as tolerated, toe touch weight bearing RLE.   Son in law present at bedside.     DNR on chart: Yes    Allergies    iodine (Unknown)    Intolerances    Keppra (Other)  MEDICATIONS  (STANDING):  aspirin enteric coated 81 milliGRAM(s) Oral daily  ATENolol  Tablet 50 milliGRAM(s) Oral daily  atorvastatin 40 milliGRAM(s) Oral at bedtime  carbidopa/levodopa  25/100 1.5 Tablet(s) Oral <User Schedule>  carbidopa/levodopa CR 25/100 1 Tablet(s) Oral <User Schedule>  clopidogrel Tablet 75 milliGRAM(s) Oral daily  cyanocobalamin 1000 MICROGram(s) Oral daily  docusate sodium 100 milliGRAM(s) Oral two times a day  famotidine    Tablet 20 milliGRAM(s) Oral daily  finasteride 5 milliGRAM(s) Oral daily  folic acid 1 milliGRAM(s) Oral daily  heparin  Injectable 5000 Unit(s) SubCutaneous every 8 hours  QUEtiapine 25 milliGRAM(s) Oral at bedtime  sodium chloride 0.9%. 1000 milliLiter(s) (10 mL/Hr) IV Continuous <Continuous>    MEDICATIONS  (PRN):  acetaminophen   Tablet .. 650 milliGRAM(s) Oral every 6 hours PRN Mild Pain (1 - 3), Moderate Pain (4 - 6), Severe Pain (7 - 10)  HYDROmorphone  Injectable 0.5 milliGRAM(s) IV Push every 2 hours PRN moderate/severe pain    ITEMS UNCHECKED ARE NOT PRESENT    PRESENT SYMPTOMS: [ ]Unable to obtain due to poor mentation   Source if other than patient:  [x]Family   [x]Team     Pain (Impact on QOL):  no apparent distress  Location:       Minimal acceptable level (0-10 scale):                    Aggrevating factors:  Quality:  Radiation:  Severity (0-10 scale):     Dyspnea:                           [ ]Mild [ ]Moderate [ ]Severe  Anxiety:                             [ ]Mild [ ]Moderate [ ]Severe  Fatigue:                             [ ]Mild [ ]Moderate [ ]Severe  Nausea:                             [ ]Mild [ ]Moderate [ ]Severe  Loss of appetite:              [ ]Mild [ ]Moderate [ ]Severe  Constipation:                    [ ]Mild [ ]Moderate [ ]Severe    PAINAD Score: 0    [email or web address] (Ctrl +  left click to view)  		  Other Symptoms:  [ ]All other review of systems negative     Karnofsky Performance Score/Palliative Performance Status Version 2:    30     %  PHYSICAL EXAM:  Vital Signs Last 24 Hrs  T(C): 36.4 (16 Dec 2018 08:02), Max: 36.5 (16 Dec 2018 05:36)  T(F): 97.5 (16 Dec 2018 08:02), Max: 97.7 (16 Dec 2018 05:36)  HR: 75 (16 Dec 2018 08:35) (70 - 89)  BP: 115/63 (16 Dec 2018 08:35) (80/42 - 177/75)  BP(mean): --  RR: 18 (16 Dec 2018 08:02) (16 - 18)  SpO2: 92% (16 Dec 2018 08:02) (89% - 95%) I&O's Summary  GENERAL:  [ ]Alert  [ ]Oriented x   [x]Lethargic  [ ]Cachexia  [x]Arousable  [ ]Verbal  [x]Non-Verbal  Behavioral:   [ ] Anxiety  [ ] Delirium [ ] Agitation [ ] Other  HEENT:  [x]Normal   [ ]Dry mouth   [ ]ET Tube/Trach  [ ]Oral lesions  PULMONARY:   [x]Clear [ ]Tachypnea  [ ]Audible excessive secretions   [ ]Rhonchi        [ ]Right [ ]Left [ ]Bilateral  [ ]Crackles        [ ]Right [ ]Left [ ]Bilateral  [ ]Wheezing     [ ]Right [ ]Left [ ]Bilateral  CARDIOVASCULAR:    [x]Regular [ ]Irregular [ ]Tachy  [ ]Danny [ ]Murmur [ ]Other  GASTROINTESTINAL:  [x]Soft  [ ]Distended   [x]+BS  [x]Non tender [ ]Tender  [ ]PEG [ ]OGT/ NGT   Last BM:   not documented   GENITOURINARY:  [ ]Normal [x] Incontinent   [ ]Oliguria/Anuria   [ ]Prieto  MUSCULOSKELETAL:   [ ]Normal   [x]Weakness  [x]Bed/Wheelchair bound [ ]Edema  NEUROLOGIC:   [ ]No focal deficits  [x] Cognitive impairment  [ ] Dysphagia [ ]Dysarthria [ ] Paresis [ ]Other   SKIN:   [x]Normal   [ ]Pressure ulcer(s)  [ ]Rash    CRITICAL CARE:  [ ] Shock Present  [ ]Septic [ ]Cardiogenic [ ]Neurologic [ ]Hypovolemic  [ ]  Vasopressors [ ]  Inotropes   [ ] Respiratory failure present  [ ] Acute  [ ] Chronic [ ] Hypoxic  [ ] Hypercarbic [ ] Other  [ ] Other organ failure     LABS: reviewed recent labs                         12.2   19.3  )-----------( 196      ( 16 Dec 2018 01:19 )             36.6   12-16    142  |  104  |  36<H>  ----------------------------<  141<H>  4.5   |  23  |  1.89<H>    Ca    8.7      16 Dec 2018 08:22  Phos  3.9     12-16  Mg     2.0     12-16    TPro  6.1  /  Alb  3.3  /  TBili  0.7  /  DBili  x   /  AST  15  /  ALT  7<L>  /  AlkPhos  75  12-16    RADIOLOGY & ADDITIONAL STUDIES:  < from: CT Pelvis Bony Only No Cont (12.16.18 @ 00:51) >  INTERPRETATION:    CT OF THE PELVIS  IMPRESSION:  1.  Comminuted right acetabular fracture involving the inferior pubic   ramus, anterior wall of the acetabulum, posterior wall of the acetabulum,   and medial wall of the acetabulum with extension into the iliac wing. The   pattern is overall consistent with a both-column acetabular fracture.  2.  Hematoma about the right acetabular fracture extending into the right   pelvic sidewall causing mass effect on the adjacent structures.    < from: CT Head No Cont (12.16.18 @ 01:40) >  IMPRESSION:   CT BRAIN: No acute intracranial bleeding, mass effect, or shift.   CT CERVICAL SPINE: No fracture or subluxation. Straightened lordosis.       PROTEIN CALORIE MALNUTRITION:   [x] PPSV2 < or = 30% [ ] significant weight loss [ ] poor nutritional intake [ ] anasarca [ ] catabolic state   Albumin, Serum: 3.3 g/dL (12-16-18 @ 08:22)   Artificial Nutrition [ ]     REFERRALS:   [ ]Chaplaincy  [x] Hospice  [ ]Child Life  [ ]Social Work  [ ]Case management [ ]Holistic Therapy [x] Physical Therapy [ ] Dietary   Goals of Care Document: GAP TEAM PALLIATIVE CARE UNIT PROGRESS NOTE:      [ x] Patient on hospice program.    INDICATION FOR PALLIATIVE CARE UNIT SERVICES: acute illness management, symptom management s/p fall with right acetabular / pelvic fracture     INTERVAL HPI/OVERNIGHT EVENTS:  Patient seen and evaluated at bedside.   Sleeping but arousable- nonverbal, not able to follow commands.   Does not appear in any distress, noted bilateral upper extremity tremors.  Did not require any rescue dose of Dilaudid for pain.   Afebrile, but hypotensive this AM- which responded to 200cc IV fluid.  On admission- leukocytosis, but no other evidence of sepsis. Will monitor off antibiotics.   Per ortho- no surgical intervention offered, to continue PT as tolerated, toe touch weight bearing RLE.   Son in law present at bedside.     DNR on chart: Yes    Allergies    iodine (Unknown)    Intolerances    Keppra (Other)  MEDICATIONS  (STANDING):  aspirin enteric coated 81 milliGRAM(s) Oral daily  ATENolol  Tablet 50 milliGRAM(s) Oral daily  atorvastatin 40 milliGRAM(s) Oral at bedtime  carbidopa/levodopa  25/100 1.5 Tablet(s) Oral <User Schedule>  carbidopa/levodopa CR 25/100 1 Tablet(s) Oral <User Schedule>  clopidogrel Tablet 75 milliGRAM(s) Oral daily  cyanocobalamin 1000 MICROGram(s) Oral daily  docusate sodium 100 milliGRAM(s) Oral two times a day  famotidine    Tablet 20 milliGRAM(s) Oral daily  finasteride 5 milliGRAM(s) Oral daily  folic acid 1 milliGRAM(s) Oral daily  heparin  Injectable 5000 Unit(s) SubCutaneous every 8 hours  QUEtiapine 25 milliGRAM(s) Oral at bedtime  sodium chloride 0.9%. 1000 milliLiter(s) (10 mL/Hr) IV Continuous <Continuous>    MEDICATIONS  (PRN):  acetaminophen   Tablet .. 650 milliGRAM(s) Oral every 6 hours PRN Mild Pain (1 - 3), Moderate Pain (4 - 6), Severe Pain (7 - 10)  HYDROmorphone  Injectable 0.5 milliGRAM(s) IV Push every 2 hours PRN moderate/severe pain    ITEMS UNCHECKED ARE NOT PRESENT    PRESENT SYMPTOMS: [ ]Unable to obtain due to poor mentation   Source if other than patient:  [x]Family   [x]Team     Pain (Impact on QOL):  no apparent distress  Location:       Minimal acceptable level (0-10 scale):                    Aggrevating factors:  Quality:  Radiation:  Severity (0-10 scale):     Dyspnea:                           [ ]Mild [ ]Moderate [ ]Severe  Anxiety:                             [ ]Mild [ ]Moderate [ ]Severe  Fatigue:                             [ ]Mild [ ]Moderate [ ]Severe  Nausea:                             [ ]Mild [ ]Moderate [ ]Severe  Loss of appetite:              [ ]Mild [ ]Moderate [ ]Severe  Constipation:                    [ ]Mild [ ]Moderate [ ]Severe    PAINAD Score: 0    [email or web address] (Ctrl +  left click to view)  		  Other Symptoms:  [x ]All other review of systems negative     Karnofsky Performance Score/Palliative Performance Status Version 2:    30     %  PHYSICAL EXAM:  Vital Signs Last 24 Hrs  T(C): 36.4 (16 Dec 2018 08:02), Max: 36.5 (16 Dec 2018 05:36)  T(F): 97.5 (16 Dec 2018 08:02), Max: 97.7 (16 Dec 2018 05:36)  HR: 75 (16 Dec 2018 08:35) (70 - 89)  BP: 115/63 (16 Dec 2018 08:35) (80/42 - 177/75)  BP(mean): --  RR: 18 (16 Dec 2018 08:02) (16 - 18)  SpO2: 92% (16 Dec 2018 08:02) (89% - 95%) I&O's Summary  GENERAL:  [ ]Alert  [ ]Oriented x   [x]Lethargic  [ ]Cachexia  [x]Arousable  [ ]Verbal  [x]Non-Verbal  Behavioral:   [ ] Anxiety  [ ] Delirium [ ] Agitation [ ] Other  HEENT:  [x]Normal   [ ]Dry mouth   [ ]ET Tube/Trach  [ ]Oral lesions  PULMONARY:   [x]Clear [ ]Tachypnea  [ ]Audible excessive secretions   [ ]Rhonchi        [ ]Right [ ]Left [ ]Bilateral  [ ]Crackles        [ ]Right [ ]Left [ ]Bilateral  [ ]Wheezing     [ ]Right [ ]Left [ ]Bilateral  CARDIOVASCULAR:    [x]Regular [ ]Irregular [ ]Tachy  [ ]Danny [ ]Murmur [ ]Other  GASTROINTESTINAL:  [x]Soft  [ ]Distended   [x]+BS  [x]Non tender [ ]Tender  [ ]PEG [ ]OGT/ NGT   Last BM:   not documented   GENITOURINARY:  [ ]Normal [x] Incontinent   [ ]Oliguria/Anuria   [ ]Prieto  MUSCULOSKELETAL: hand ringing noted  [ ]Normal   [x]Weakness  [x]Bed/Wheelchair bound [ ]Edema  NEUROLOGIC:   [ ]No focal deficits  [x] Cognitive impairment  [ ] Dysphagia [ ]Dysarthria [ ] Paresis [ ]Other   SKIN:   [x]Normal   [ ]Pressure ulcer(s)  [ ]Rash    CRITICAL CARE: none  [ ] Shock Present  [ ]Septic [ ]Cardiogenic [ ]Neurologic [ ]Hypovolemic  [ ]  Vasopressors [ ]  Inotropes   [ ] Respiratory failure present  [ ] Acute  [ ] Chronic [ ] Hypoxic  [ ] Hypercarbic [ ] Other  [ ] Other organ failure     LABS: reviewed recent labs                         12.2   19.3  )-----------( 196      ( 16 Dec 2018 01:19 )             36.6   12-16    142  |  104  |  36<H>  ----------------------------<  141<H>  4.5   |  23  |  1.89<H>    Ca    8.7      16 Dec 2018 08:22  Phos  3.9     12-16  Mg     2.0     12-16    TPro  6.1  /  Alb  3.3  /  TBili  0.7  /  DBili  x   /  AST  15  /  ALT  7<L>  /  AlkPhos  75  12-16    RADIOLOGY & ADDITIONAL STUDIES:  < from: CT Pelvis Bony Only No Cont (12.16.18 @ 00:51) >  INTERPRETATION:    CT OF THE PELVIS  IMPRESSION:  1.  Comminuted right acetabular fracture involving the inferior pubic   ramus, anterior wall of the acetabulum, posterior wall of the acetabulum,   and medial wall of the acetabulum with extension into the iliac wing. The   pattern is overall consistent with a both-column acetabular fracture.  2.  Hematoma about the right acetabular fracture extending into the right   pelvic sidewall causing mass effect on the adjacent structures.    < from: CT Head No Cont (12.16.18 @ 01:40) >  IMPRESSION:   CT BRAIN: No acute intracranial bleeding, mass effect, or shift.   CT CERVICAL SPINE: No fracture or subluxation. Straightened lordosis.     PROTEIN CALORIE MALNUTRITION:   [x] PPSV2 < or = 30% [ ] significant weight loss [ x] poor nutritional intake [ ] anasarca [ ] catabolic state   Albumin, Serum: 3.3 g/dL (12-16-18 @ 08:22)   Artificial Nutrition [ ]     REFERRALS:   [ ]Chaplaincy  [x] Hospice  [ ]Child Life  [ ]Social Work  [ ]Case management [ ]Holistic Therapy [x] Physical Therapy [ ] Dietary   Goals of Care Document:

## 2018-12-16 NOTE — PROGRESS NOTE ADULT - PROBLEM SELECTOR PLAN 6
Patient in PCU under Hospice for acute illness and symptom management. No surgical intervention offered for right acetabular/pelvic fracture- pending PT evaluation. Will monitor leukocytosis off antibiotics- consider septic work up if any change in clinical condition (fever, symptoms). Pain is well controlled- no opioid doses required. Continue all other home medications. Advance diet as tolerated. Hospice team to follow up in AM.

## 2018-12-16 NOTE — ED ADULT NURSE NOTE - NSIMPLEMENTINTERV_GEN_ALL_ED
Implemented All Fall with Harm Risk Interventions:  [place] to call system. Call bell, personal items and telephone within reach. Instruct patient to call for assistance. Room bathroom lighting operational. Non-slip footwear when patient is off stretcher. Physically safe environment: no spills, clutter or unnecessary equipment. Stretcher in lowest position, wheels locked, appropriate side rails in place. Provide visual cue, wrist band, yellow gown, etc. Monitor gait and stability. Monitor for mental status changes and reorient to person, place, and time. Review medications for side effects contributing to fall risk. Reinforce activity limits and safety measures with patient and family. Provide visual clues: red socks.

## 2018-12-16 NOTE — H&P ADULT - NSHPLABSRESULTS_GEN_ALL_CORE
labs personally reviewed : leukocytosis 19.3, anemia 12.2 near baseline, plt wnl, rest cbc unrevealing, cmp with camille, mild hyperglycemia, rest cmp unrevealing.  Imaging personally reviewed   CXR small L pleff. XR pelvis/femur neg for fx. XR hip +comminuted R acetabular fx.  CTH/cspine no acute findings.  no ekg available for personal review

## 2018-12-16 NOTE — H&P ADULT - PROBLEM SELECTOR PLAN 3
-hold lasix temporarily in setting of camille  -pt with dry MM on exam and hx of recent decreased PO intake  -start gentle hydration NS 75 cc/hr x 12 hours and reassess.

## 2018-12-16 NOTE — PROGRESS NOTE ADULT - PROBLEM SELECTOR PLAN 3
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  -gentle hydration

## 2018-12-16 NOTE — H&P ADULT - NSHPSOCIALHISTORY_GEN_ALL_CORE
Social History:    Marital Status:  ( x  )    (   ) Single    (   )    (  )   Occupation: retired   Lives with: (  ) alone  (  ) children   ( x ) spouse   (  ) parents  (  ) other    Substance Use (street drugs): ( x ) never used  (  ) other:  Tobacco Usage:  ( x  ) never smoked   (   ) former smoker   (   ) current smoker  (     ) pack year  (        ) last cigarette date  Alcohol Usage: denies

## 2018-12-16 NOTE — PHYSICAL THERAPY INITIAL EVALUATION ADULT - PRECAUTIONS/LIMITATIONS, REHAB EVAL
+Pelvis x-ray 12/16/18: Fracture of the right acetabulum and inferior right pubic fracture. L5 kyphoplasty.

## 2018-12-16 NOTE — H&P ADULT - PROBLEM SELECTOR PLAN 2
-afebrile verbally per RN, pending documentation in sunrise. NO fevers per history from family at bedside  -suspect this is reactive in setting of acute fracture; family denies any localizing c/o infectious etio  -f/u ua/ucx, pending sample  -observe off abx

## 2018-12-16 NOTE — PATIENT PROFILE ADULT - FALL HARM RISK
age(85 years old or older)/bones(Osteoporosis,prev fx,steroid use,metastatic bone ca)/other/coagulation(Bleeding disorder R/T clinical cond/anti-coags)

## 2018-12-16 NOTE — PROGRESS NOTE ADULT - ASSESSMENT
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 No surgical intervention being offered,

## 2018-12-16 NOTE — PHYSICAL THERAPY INITIAL EVALUATION ADULT - PERTINENT HX OF CURRENT PROBLEM, REHAB EVAL
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 (-) Head CT 12/16/18. C-spine CT 12/16/18: No fracture or subluxation. Straightened lordosis. +CXR 12/16/18: Small left pleural effusion and basilar atelectasis. +Pelvis x-ray 12/16/18: Right acetabular fracture.

## 2018-12-16 NOTE — H&P ADULT - ASSESSMENT
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

## 2018-12-16 NOTE — ED ADULT NURSE NOTE - OBJECTIVE STATEMENT
87[facility] Patient was in the bathroom, slipped and fell to the right side. Fall was unwitnessed, but wife heard the fall and came in right away and reports that patient was awake. Patient initially complained of head pain on anticoags (Plavix and ASA daily). Patient was not ambulatory at the scene. Reports severe right hip pain and knee pain. PMHx parkinsons, BPH, CAD, HTN, osteoporosis. Patient has some dementia according to family. Right leg appears externally rotated and shortened.

## 2018-12-16 NOTE — H&P ADULT - HISTORY OF PRESENT ILLNESS
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    VS: no temp documented in sunrise, 71, 125/72, 16, 95%RA. labs: leukocytosis 19.3, anemia 12.2 near baseline, plt wnl, rest cbc unrevealing, cmp with camille, mild hyperglycemia, rest cmp unrevealing.  CXR small L pleff. XR pelvis/femur neg for fx. XR hip +comminuted R acetabular fx.  CTH/cspine no acute findings. Ortho eval in ER. In ER, pt received, tylenol 1 gram IV x 1, dilaudid 0.5 iv x 1, morphine 2 iv x 2, and toradol iv x 1 prior to medicine team involvement. Hospice team notified by Er team. 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 Pt lives at home with wife. Wife saw him in the bathroom, slip/trip and fall, 2/2 ongoing balance issues related to his progressive parkinson's disease. +head strike. Could not stand afterwards. +R leg pain. Pt did not appear dizzy (currently pt is delirious and unable to verify history) and it appeared that he purely lost his footing, to his wife. Pt has not complained of cp/sob/f/c/palpitations/n/v/d/dysuria/cough/uri sx. Pt normally ambulates with walker, though family notes that he is nonadherent at times. Pt is on home hospice, with [facility] nurse visits qweekly, and part time [facility] as well; dependent on most ADLs. Normally communicative, currently "not like himself" and more confused, which family attributes to the newly dx fracture and pain medication he has received.    VS: no temp documented in sunrise but per RN afebrile, 71, 125/72, 16, 95%RA. labs: leukocytosis 19.3, anemia 12.2 near baseline, plt wnl, rest cbc unrevealing, cmp with camille, mild hyperglycemia, rest cmp unrevealing.  CXR small L pleff. XR pelvis/femur neg for fx. XR hip +comminuted R acetabular fx.  CTH/cspine no acute findings. Ortho eval in ER. In ER, pt received, tylenol 1 gram IV x 1, dilaudid 0.5 iv x 1, morphine 2 iv x 2, and toradol iv x 1 prior to medicine team involvement. Hospice team notified by Er team.

## 2018-12-16 NOTE — H&P ADULT - PROBLEM SELECTOR PLAN 1
-s/p witnessed fall  -XR hip/CT confirm acetabular fx; rest of trauma imaging without acute findings of fx, dislocation, or stroke  -Ortho eval appreciated, no acute surgical intervention planned  -pain control; s/p tylenol, morphine, dilaudid, and toradol in ER.  Given camille, will avoid further toradol.  Will start low dose dilaudid 0.5 IV q6 prn severe pain, dilaudid 0.25 q6 moderate pain in setting of camille/ckd.  Careful monitoring for oversedation, VS q4.   -incentive spirometry  -PT consult; pt will be toe touch weight bearing status on RLE  -plan for repeat o/p XR in 1 week, f/u with DR. Rodriguez 7-10 days

## 2018-12-16 NOTE — H&P ADULT - PROBLEM SELECTOR PLAN 7
-20 minutes spent clarifying advanced directives with family at bedside  -At this time, patient demonstrates limited capacity into condition 2/2 delirium, though wife/proxy present at bedside , and chooses to be DNR/DNI as per prior discussions with patient.  -All questions answered at bedside.   -Forms signed, placed in chart, order placed in sunrise.

## 2018-12-16 NOTE — PROGRESS NOTE ADULT - PROBLEM SELECTOR PROBLEM 1
Closed nondisplaced fracture of right acetabulum, unspecified portion of acetabulum, initial encounter

## 2018-12-16 NOTE — PROGRESS NOTE ADULT - ATTENDING COMMENTS
Patient seen and examined and agree with the above documentation with the following additions:   Patient s/p mechanical fall at home now with acetabular fracture with no planned surgical intervention.  Plan at this time is for pain control, gentle hydration and PT evaluation. Will discuss with [facility] on Monday plan of care moving forward.  Rest of plan as documented above. Ongoing discussion with family regarding discontinuation of non essential meds. Update provided to son in law at bedside today.

## 2018-12-16 NOTE — PROGRESS NOTE ADULT - PROBLEM SELECTOR PLAN 1
-s/p witnessed fall  -XR hip/CT confirm acetabular fx; rest of trauma imaging without acute findings of fx, dislocation, or stroke  -Ortho eval appreciated, no acute surgical intervention planned  -pain control; s/p tylenol, morphine, dilaudid, and toradol in ER.  Given camille, will avoid further toradol.  Will start low dose dilaudid 0.5 IV q6 prn severe pain, dilaudid 0.25 q6 moderate pain in setting of camille/ckd.  Careful monitoring for oversedation, VS q4.   -Incentive Spirometry  -PT consult; pt will be toe touch weight bearing status on RLE  -Plan for repeat o/p XR in 1 week, f/u with DR. Rodriguez 7-10 days -s/p witnessed fall  -XR hip/CT confirm acetabular fx; rest of trauma imaging without acute findings of fx, dislocation, or stroke  -Ortho eval appreciated, no acute surgical intervention planned  -pain control; s/p tylenol, morphine, dilaudid, and toradol in ER.  Given camille, will avoid further toradol.   continue dilaudid 0.5 IV q6 prn severe pain  -Incentive Spirometry  -PT consult; pt will be toe touch weight bearing status on RLE  -Plan for repeat o/p XR in 1 week, f/u with DR. Rodriguez 7-10 days- discuss with [facility] on Monday

## 2018-12-16 NOTE — H&P ADULT - NSHPPHYSICALEXAM_GEN_ALL_CORE
PHYSICAL EXAM:  GENERAL: NAD, chronically ill appearing patient, somnolent but arouseable to voice  HEAD:  Atraumatic, Normocephalic  EYES: EOMI, PERRLA, conjunctiva and sclera clear, dry conjunctival membranes  ENMT: No tonsillar erythema, exudates, or enlargement; drymucous membranes, poor dentition, No lesions  NECK: Supple, No JVD, Normal thyroid  CHEST/LUNG: poor insp effort, dec bs at bases, no resp distress or accessory muscle usage  HEART: Regular rate and rhythm; No murmurs, rubs, or gallops, trace edema b/l LE  ABDOMEN: Soft, Nontender, Nondistended; Bowel sounds present no rebound/guarding  EXTREMITIES:  2+ Peripheral Pulses, No clubbing, cyanosis.  +contracted positioning new since fall. RLE internally rotated, flexed at hip/knee. +TTP over lateral R hip.  LYMPH: No lymphadenopathy noted  SKIN: No rashes or lesions  NERVOUS SYSTEM:  Alert & Oriented X1, poor concentration 2/2 delirium; Motor Strength 5/5 B/L upper and uanble to assess Le as not compliant with exam.  +pill rolling tremor b/l hands at rest.

## 2018-12-17 LAB
BLD GP AB SCN SERPL QL: NEGATIVE — SIGNIFICANT CHANGE UP
GLUCOSE BLDC GLUCOMTR-MCNC: 132 MG/DL — HIGH (ref 70–99)
HCT VFR BLD CALC: 28.8 % — LOW (ref 39–50)
HGB BLD-MCNC: 9.2 G/DL — LOW (ref 13–17)
MCHC RBC-ENTMCNC: 31.9 GM/DL — LOW (ref 32–36)
MCHC RBC-ENTMCNC: 32.5 PG — SIGNIFICANT CHANGE UP (ref 27–34)
MCV RBC AUTO: 101.8 FL — HIGH (ref 80–100)
PLATELET # BLD AUTO: 123 K/UL — LOW (ref 150–400)
RBC # BLD: 2.83 M/UL — LOW (ref 4.2–5.8)
RBC # FLD: 17 % — HIGH (ref 10.3–14.5)
RH IG SCN BLD-IMP: NEGATIVE — SIGNIFICANT CHANGE UP
WBC # BLD: 12.17 K/UL — HIGH (ref 3.8–10.5)
WBC # FLD AUTO: 12.17 K/UL — HIGH (ref 3.8–10.5)

## 2018-12-17 PROCEDURE — 99233 SBSQ HOSP IP/OBS HIGH 50: CPT | Mod: GC

## 2018-12-17 RX ORDER — SODIUM CHLORIDE 9 MG/ML
1000 INJECTION, SOLUTION INTRAVENOUS
Qty: 0 | Refills: 0 | Status: DISCONTINUED | OUTPATIENT
Start: 2018-12-17 | End: 2018-12-21

## 2018-12-17 RX ORDER — ACETAMINOPHEN 500 MG
650 TABLET ORAL EVERY 6 HOURS
Qty: 0 | Refills: 0 | Status: DISCONTINUED | OUTPATIENT
Start: 2018-12-17 | End: 2018-12-20

## 2018-12-17 RX ADMIN — Medication 650 MILLIGRAM(S): at 16:55

## 2018-12-17 RX ADMIN — SODIUM CHLORIDE 40 MILLILITER(S): 9 INJECTION INTRAMUSCULAR; INTRAVENOUS; SUBCUTANEOUS at 06:29

## 2018-12-17 RX ADMIN — Medication 100 MILLIGRAM(S): at 06:27

## 2018-12-17 RX ADMIN — CARBIDOPA AND LEVODOPA 1.5 TABLET(S): 25; 100 TABLET ORAL at 06:28

## 2018-12-17 RX ADMIN — ATENOLOL 50 MILLIGRAM(S): 25 TABLET ORAL at 06:27

## 2018-12-17 NOTE — PROGRESS NOTE ADULT - ASSESSMENT
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 No surgical intervention being offered.

## 2018-12-17 NOTE — PROGRESS NOTE ADULT - PROBLEM SELECTOR PLAN 2
-Afebrile, no signs/symptoms suggestive of infection.   -Likely reactive in setting of acute fracture, downtrending  -To observe off antibiotics.   -if fever, will consider cultures at that time  -Will repeat CBC, consider septic work up PRN if any change in hemodynamic status, fevers, not resolving leukocytosis.

## 2018-12-17 NOTE — PROGRESS NOTE ADULT - PROBLEM SELECTOR PLAN 4
-c/w carbidopa  -c/w Quetiapine with parameters for sedation  -Pt on Nuplazid (pimavanserin); suggested family bring in med  -HCN contacted by ER, to further discuss mgt

## 2018-12-17 NOTE — PROGRESS NOTE ADULT - ATTENDING COMMENTS
I have personally seen and examined this patient and agree with the above assessment and plan, which I have reviewed and edited where appropriate.  Patient sleepy this am but resisting movement to all extremities,  and other labwork unremarkable.  Likely hypoactive delirium in the setting of acute fracture from fall with parkinsons.  Continue to monitor.  Started D51/2NS for gentle hydration.  Family concerned about this.  Will continue to  educate as to what to expect.  Questions answered.  Emotional support provided. I have personally seen and examined this patient and agree with the above assessment and plan, which I have reviewed and edited where appropriate.  Patient sleepy this am but resisting movement to all extremities,  and other labwork unremarkable.  Likely hypoactive delirium in the setting of acute fracture from fall with parkinsons.  Continue to monitor.  Started D51/2NS for gentle hydration.  Family concerned about this.  Will continue to  educate as to what to expect.  Questions answered.  Emotional support provided.  Would use low dose RTC pain medication and re-evaluate in 48 hours.

## 2018-12-17 NOTE — CHART NOTE - NSCHARTNOTEFT_GEN_A_CORE
spoke to patient's wife Lauren at bedside earlier this afternoon, also later in the afternoon with wife and daughter Selma at bedside.  Informed patient's wife that patient received pain medication in ER yesterday, (reviewed with family) last dose of dilaudid 0.5mg IV was given yesterday approx 5pm.   Patient has been lethargic, was admitted after a fall and found to have acetabular fracture.  Patient had pain yesterday per patient's wife.  Today patient is not arousable, appears comfortable and has not required any pain medication.    Wife's questions addressed, will monitor mental status in the morning to see if patient is less lethargic.  Patient has dementia and parkinson's disease, and per wife was sleeping a lot prior to admission (at baseline), explained that patient likely had poor reserves and that fracture can impact overall functional status, and that medications may also make patient lethargic. Patient also has parkinson's disease and may be displaying hypoactive delirium.  Patient is currently unable to take PO due to mental status.  CT head in ED negative.    Patient's wife asked about nutrition, patient is currently receiving D5NS @40 cc/hr, will reassess mental status tomorrow, at this point it is unclear if artificial nutrition is needed or in line with family and hospice goals of care. spoke to patient's wife Lauren at bedside earlier this afternoon, also later in the afternoon with wife and daughter Selma at bedside.  Informed patient's wife that patient received pain medication in ER yesterday, (reviewed with family) last dose of dilaudid 0.5mg IV was given yesterday approx 5pm.   Patient has been lethargic, was admitted after a fall and found to have acetabular fracture.  Patient had pain yesterday per patient's wife.  Today patient is not arousable, appears comfortable and has not required any pain medication.    Wife's questions addressed, will monitor mental status in the morning to see if patient is less lethargic.  Patient has dementia and parkinson's disease, and per wife was sleeping a lot prior to admission (at baseline), explained that patient likely had poor reserves and that fracture can impact overall functional status, and that medications may also make patient lethargic. Patient also has parkinson's disease and may be displaying hypoactive delirium.  Patient is currently unable to take PO due to mental status.  CT head in ED negative.    Patient's wife asked about nutrition, patient is currently receiving D5NS @40 cc/hr, will reassess mental status tomorrow, at this point it is unclear if artificial nutrition is needed or in line with family and hospice goals of care.    Met with both daughters subsequently.  Daughters  educated as to what to expect.  Questions answered.  Emotional support provided.

## 2018-12-17 NOTE — PROGRESS NOTE ADULT - PROBLEM SELECTOR PLAN 5
-c/w Aspirin  -c/w Plavix  -c/w Statin  -pending clinical course, can likely discuss with family discontinuation of non essential medication.  -c/w Atenolol

## 2018-12-17 NOTE — PROGRESS NOTE ADULT - PROBLEM SELECTOR PLAN 3
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  -gentle hydration

## 2018-12-18 LAB
APPEARANCE UR: CLEAR — SIGNIFICANT CHANGE UP
BILIRUB UR-MCNC: NEGATIVE — SIGNIFICANT CHANGE UP
COLOR SPEC: YELLOW — SIGNIFICANT CHANGE UP
DIFF PNL FLD: ABNORMAL
GLUCOSE UR QL: NEGATIVE MG/DL — SIGNIFICANT CHANGE UP
KETONES UR-MCNC: NEGATIVE — SIGNIFICANT CHANGE UP
LEUKOCYTE ESTERASE UR-ACNC: NEGATIVE — SIGNIFICANT CHANGE UP
NITRITE UR-MCNC: NEGATIVE — SIGNIFICANT CHANGE UP
PH UR: 6 — SIGNIFICANT CHANGE UP (ref 5–8)
PROT UR-MCNC: 30 MG/DL
SP GR SPEC: 1.02 — SIGNIFICANT CHANGE UP (ref 1.01–1.02)
UROBILINOGEN FLD QL: 1 MG/DL — SIGNIFICANT CHANGE UP

## 2018-12-18 PROCEDURE — 99233 SBSQ HOSP IP/OBS HIGH 50: CPT | Mod: GC

## 2018-12-18 RX ORDER — FAMOTIDINE 10 MG/ML
20 INJECTION INTRAVENOUS DAILY
Qty: 0 | Refills: 0 | Status: DISCONTINUED | OUTPATIENT
Start: 2018-12-18 | End: 2018-12-22

## 2018-12-18 RX ORDER — FAMOTIDINE 10 MG/ML
20 INJECTION INTRAVENOUS DAILY
Qty: 0 | Refills: 0 | Status: DISCONTINUED | OUTPATIENT
Start: 2018-12-18 | End: 2018-12-18

## 2018-12-18 RX ORDER — HYDROMORPHONE HYDROCHLORIDE 2 MG/ML
0.2 INJECTION INTRAMUSCULAR; INTRAVENOUS; SUBCUTANEOUS
Qty: 0 | Refills: 0 | Status: DISCONTINUED | OUTPATIENT
Start: 2018-12-18 | End: 2018-12-21

## 2018-12-18 RX ADMIN — SODIUM CHLORIDE 40 MILLILITER(S): 9 INJECTION, SOLUTION INTRAVENOUS at 16:08

## 2018-12-18 RX ADMIN — HYDROMORPHONE HYDROCHLORIDE 0.2 MILLIGRAM(S): 2 INJECTION INTRAMUSCULAR; INTRAVENOUS; SUBCUTANEOUS at 17:51

## 2018-12-18 RX ADMIN — HYDROMORPHONE HYDROCHLORIDE 0.2 MILLIGRAM(S): 2 INJECTION INTRAMUSCULAR; INTRAVENOUS; SUBCUTANEOUS at 17:36

## 2018-12-18 NOTE — PROGRESS NOTE ADULT - PROBLEM SELECTOR PLAN 6
Patient in PCU under Hospice for acute illness and symptom management. No surgical intervention offered for right acetabular/pelvic fracture- pending PT evaluation. Will monitor leukocytosis off antibiotics- consider septic work up if any change in clinical condition (fever, symptoms). Pain is well controlled- no opioid doses required. Continue all other home medications. Advance diet as tolerated. Hospice team to follow up in AM. Prieto will be place as per family.

## 2018-12-18 NOTE — PROGRESS NOTE ADULT - PROBLEM SELECTOR PLAN 2
-Afebrile, no signs/symptoms suggestive of infection.   -Likely reactive in setting of acute fracture, downtrending  -To observe off antibiotics.   -Leukocytosis trending down, however due to patient's mental status and low grade fever of 99.4, UA will be done to test for UTI  -if fever, will consider cultures at that time  -Will repeat CBC, consider septic work up PRN if any change in hemodynamic status, fevers, not resolving leukocytosis. -Likely reactive in setting of acute fracture, downtrending  -To observe off antibiotics.   -Leukocytosis trending down, however due to patient's mental status and low grade fever of 99.4, UA will be done to test for UTI  -if fever, will consider cultures at that time  -Will repeat CBC, consider septic work up PRN if any change in hemodynamic status, fevers, not resolving leukocytosis.

## 2018-12-18 NOTE — PROGRESS NOTE ADULT - ATTENDING COMMENTS
I have personally seen and examined this patient and agree with the above assessment and plan, which I have reviewed and edited where appropriate.   Family meeting with wife and daughters.  Discussed the outcome of fractures in patients with dementia and parkinson's disease.  Family endorses cognitive impairment and dysphagia even prior to fall. We will continue to hydrate, treat pain and rule out underlying infection.  Goal is comfort.  Pleasure feeds if pt able.

## 2018-12-18 NOTE — PROGRESS NOTE ADULT - PROBLEM SELECTOR PLAN 1
-s/p witnessed fall  -XR hip/CT confirm acetabular fx; rest of trauma imaging without acute findings of fx, dislocation, or stroke  -Ortho eval appreciated, no acute surgical intervention planned  -pain control; s/p tylenol, morphine, dilaudid, and toradol in ER.  Given camille, will avoid further toradol.   continue dilaudid 0.5 IV q6 prn severe pain  -Incentive Spirometry  -PT consult; pt will be toe touch weight bearing status on RLE  -Plan for repeat o/p XR in 1 week, f/u with DR. Rodriguez

## 2018-12-18 NOTE — PROGRESS NOTE ADULT - SUBJECTIVE AND OBJECTIVE BOX
GAP TEAM PALLIATIVE CARE UNIT PROGRESS NOTE:      [x] Patient on hospice program.    INDICATION FOR PALLIATIVE CARE UNIT SERVICES: acute illness management, symptom management s/p fall with right acetabular fracture    INTERVAL HPI/OVERNIGHT EVENTS: Patient evaluated at beside. PT evaluated yesterday but deferred due to patient's unarousable state. Patient is more arousable today and opens eyes to verbal stimuli. Goals of care were discussed with family. They agreed to Prieto catheter and pain meds as needed. Family deferred NGT due to prior conversations with patient regarding his goals of care.    DNR on chart: Yes    Allergies    iodine (Unknown)    Intolerances    Keppra (Other)  MEDICATIONS  (STANDING):  aspirin enteric coated 81 milliGRAM(s) Oral daily  ATENolol  Tablet 50 milliGRAM(s) Oral daily  atorvastatin 40 milliGRAM(s) Oral at bedtime  carbidopa/levodopa  25/100 1.5 Tablet(s) Oral <User Schedule>  carbidopa/levodopa CR 25/100 1 Tablet(s) Oral <User Schedule>  clopidogrel Tablet 75 milliGRAM(s) Oral daily  cyanocobalamin 1000 MICROGram(s) Oral daily  dextrose 5% + sodium chloride 0.45%. 1000 milliLiter(s) (40 mL/Hr) IV Continuous <Continuous>  docusate sodium 100 milliGRAM(s) Oral two times a day  famotidine    Tablet 20 milliGRAM(s) Oral daily  finasteride 5 milliGRAM(s) Oral daily  folic acid 1 milliGRAM(s) Oral daily  QUEtiapine 25 milliGRAM(s) Oral at bedtime    MEDICATIONS  (PRN):  acetaminophen   Tablet .. 650 milliGRAM(s) Oral every 6 hours PRN Mild Pain (1 - 3), Moderate Pain (4 - 6), Severe Pain (7 - 10)  acetaminophen  Suppository .. 650 milliGRAM(s) Rectal every 6 hours PRN Temp greater or equal to 38C (100.4F)  HYDROmorphone  Injectable 0.2 milliGRAM(s) IV Push every 2 hours PRN moderate/severe pain    ITEMS UNCHECKED ARE NOT PRESENT    PRESENT SYMPTOMS: [x]Unable to obtain due to poor mentation     Source if other than patient:  [x]Family   [x]Team     Pain (Impact on QOL):    Location:       Minimal acceptable level (0-10 scale):  Aggravating factors:  Quality:  Radiation:  Severity (0-10 scale):     Dyspnea:                           [ ]Mild [ ]Moderate [ ]Severe  Anxiety:                             [ ]Mild [ ]Moderate [ ]Severe  Fatigue:                             [ ]Mild [ ]Moderate [ ]Severe  Nausea:                             [ ]Mild [ ]Moderate [ ]Severe  Loss of appetite:              [ ]Mild [ ]Moderate [ ]Severe  Constipation:                    [ ]Mild [ ]Moderate [ ]Severe    PAINAD Score:    [email or web address] (Ctrl +  left click to view)  		  Other Symptoms:  [x]All other review of systems negative     Karnofsky Performance Score/Palliative Performance Status Version 2:         %        http://palliative.info/resource_material/PPSv2.pdf    PHYSICAL EXAM:    Vital Signs Last 24 Hrs  T(C): 37.4 (18 Dec 2018 08:21), Max: 37.4 (18 Dec 2018 08:21)  T(F): 99.4 (18 Dec 2018 08:21), Max: 99.4 (18 Dec 2018 08:21)  HR: 70 (18 Dec 2018 08:21) (70 - 70)  BP: 120/63 (18 Dec 2018 08:21) (120/63 - 120/63)  BP(mean): --  RR: 20 (18 Dec 2018 08:21) (20 - 20)  SpO2: 93% (18 Dec 2018 08:21) (93% - 93%) I&O's Summary  GENERAL:  [ ]Alert  [ ]Oriented x   [ ]Lethargic  [ ]Cachexia  [ ]Unarousable  [ ]Verbal  [ ]Non-Verbal    Behavioral:   [ ] Anxiety  [x] Delirium- possible hypoactive [ ] Agitation [ ] Other    HEENT:  [x]Normal   [ ]Dry mouth   [ ]ET Tube/Trach  [ ]Oral lesions    PULMONARY:   [ ]Clear [ ]Tachypnea  [ ]Audible excessive secretions   [ ]Rhonchi        [ ]Right [ ]Left [ ]Bilateral  [ ]Crackles        [ ]Right [ ]Left [ ]Bilateral  [x]Wheezing     [ ]Right [ ]Left [x]Bilateral    CARDIOVASCULAR:    [x]Regular [ ]Irregular [ ]Tachy  [ ]Danny [ ]Murmur [ ]Other    GASTROINTESTINAL:  [x]Soft  [ ]Distended   [ ]+BS  [ ]Non tender [ ]Tender  [ ]PEG [ ]OGT/ NGT   Last BM:       GENITOURINARY:  [ ]Normal [x] Incontinent   [ ]Oliguria/Anuria   [ ]Prieto    MUSCULOSKELETAL:   [ ]Normal   [ ]Weakness  [x]Bed/Wheelchair bound [ ]Edema    NEUROLOGIC:   [ ]No focal deficits  [x] Cognitive impairment  [ ] Dysphagia [ ]Dysarthria [ ] Paresis [ ]Other     SKIN:   [x]Normal   [ ]Pressure ulcer(s)  [ ]Rash    CRITICAL CARE:  [ ] Shock Present  [ ]Septic [ ]Cardiogenic [ ]Neurologic [ ]Hypovolemic  [ ]  Vasopressors [ ]  Inotropes   [ ] Respiratory failure present  [ ] Acute  [ ] Chronic [ ] Hypoxic  [ ] Hypercarbic [ ] Other  [ ] Other organ failure     LABS:                        9.2    12.17 )-----------( 123      ( 17 Dec 2018 07:45 )             28.8             RADIOLOGY & ADDITIONAL STUDIES:    PROTEIN CALORIE MALNUTRITION: [ ] yes   [ ]no  [ ] PPSV2 < or = 30% [ ] significant weight loss [ ] poor nutritional intake [ ] anasarca [ ] catabolic state   Albumin, Serum: 3.3 g/dL (12-16-18 @ 08:22)   Artificial Nutrition [ ]     REFERRALS:   [ ]Chaplaincy  [ ] Hospice  [ ]Child Life  [ ]Social Work  [ ]Case management [ ]Holistic Therapy [ ] Physical Therapy [ ] Dietary   Goals of Care Document: GAP TEAM PALLIATIVE CARE UNIT PROGRESS NOTE:      [x] Patient on hospice program.    INDICATION FOR PALLIATIVE CARE UNIT SERVICES: acute illness management, symptom management s/p fall with right acetabular fracture    INTERVAL HPI/OVERNIGHT EVENTS: Patient evaluated at beside. PT evaluated yesterday but deferred due to patient's unarousable state. Patient is more arousable today and opens eyes to verbal stimuli. Goals of care were discussed with family. They agreed to Prieto catheter and pain meds as needed. Family deferred NGT due to prior conversations with patient regarding his goals of care.    DNR on chart: Yes    Allergies    iodine (Unknown)    Intolerances    Keppra (Other)  MEDICATIONS  (STANDING):  aspirin enteric coated 81 milliGRAM(s) Oral daily  ATENolol  Tablet 50 milliGRAM(s) Oral daily  atorvastatin 40 milliGRAM(s) Oral at bedtime  carbidopa/levodopa  25/100 1.5 Tablet(s) Oral <User Schedule>  carbidopa/levodopa CR 25/100 1 Tablet(s) Oral <User Schedule>  clopidogrel Tablet 75 milliGRAM(s) Oral daily  cyanocobalamin 1000 MICROGram(s) Oral daily  dextrose 5% + sodium chloride 0.45%. 1000 milliLiter(s) (40 mL/Hr) IV Continuous <Continuous>  docusate sodium 100 milliGRAM(s) Oral two times a day  famotidine    Tablet 20 milliGRAM(s) Oral daily  finasteride 5 milliGRAM(s) Oral daily  folic acid 1 milliGRAM(s) Oral daily  QUEtiapine 25 milliGRAM(s) Oral at bedtime    MEDICATIONS  (PRN):  acetaminophen   Tablet .. 650 milliGRAM(s) Oral every 6 hours PRN Mild Pain (1 - 3), Moderate Pain (4 - 6), Severe Pain (7 - 10)  acetaminophen  Suppository .. 650 milliGRAM(s) Rectal every 6 hours PRN Temp greater or equal to 38C (100.4F)  HYDROmorphone  Injectable 0.2 milliGRAM(s) IV Push every 2 hours PRN moderate/severe pain    ITEMS UNCHECKED ARE NOT PRESENT    PRESENT SYMPTOMS: [x]Unable to obtain due to poor mentation     Source if other than patient:  [x]Family   [x]Team     Pain (Impact on QOL):    Location:       Minimal acceptable level (0-10 scale):  Aggravating factors:  Quality:  Radiation:  Severity (0-10 scale):     Dyspnea:                           [ ]Mild [ ]Moderate [ ]Severe  Anxiety:                             [ ]Mild [ ]Moderate [ ]Severe  Fatigue:                             [ ]Mild [ ]Moderate [ ]Severe  Nausea:                             [ ]Mild [ ]Moderate [ ]Severe  Loss of appetite:              [ ]Mild [ ]Moderate [ ]Severe  Constipation:                    [ ]Mild [ ]Moderate [ ]Severe    PAINAD Score:    [email or web address] (Ctrl +  left click to view)  		  Other Symptoms:  [x]All other review of systems negative     Karnofsky Performance Score/Palliative Performance Status Version 2:    30     %        http://palliative.info/resource_material/PPSv2.pdf    PHYSICAL EXAM:    Vital Signs Last 24 Hrs  T(C): 37.4 (18 Dec 2018 08:21), Max: 37.4 (18 Dec 2018 08:21)  T(F): 99.4 (18 Dec 2018 08:21), Max: 99.4 (18 Dec 2018 08:21)  HR: 70 (18 Dec 2018 08:21) (70 - 70)  BP: 120/63 (18 Dec 2018 08:21) (120/63 - 120/63)  BP(mean): --  RR: 20 (18 Dec 2018 08:21) (20 - 20)  SpO2: 93% (18 Dec 2018 08:21) (93% - 93%) I&O's Summary  GENERAL:  [ ]Alert  [ ]Oriented x   [ ]Lethargic  [ ]Cachexia  [ ]Unarousable  [ ]Verbal  [x ]Non-Verbal    Behavioral:   [ ] Anxiety  [x] Delirium- possible hypoactive [ ] Agitation [ ] Other    HEENT:  [x]Normal   [ ]Dry mouth   [ ]ET Tube/Trach  [ ]Oral lesions    PULMONARY:   [ ]Clear [ ]Tachypnea  [ ]Audible excessive secretions   [ ]Rhonchi        [ ]Right [ ]Left [ ]Bilateral  [ ]Crackles        [ ]Right [ ]Left [ ]Bilateral  [x]Wheezing     [ ]Right [ ]Left [x]Bilateral    CARDIOVASCULAR:    [x]Regular [ ]Irregular [ ]Tachy  [ ]Danny [ ]Murmur [ ]Other +S1 +S2      GASTROINTESTINAL:  [x]Soft  [ ]Distended   [ ]+BS  [ ]Non tender [ ]Tender  [ ]PEG [ ]OGT/ NGT   Last BM:   See RN documentation in the EMR which I have reviewed.     GENITOURINARY:  [ ]Normal [x] Incontinent   [ ]Oliguria/Anuria   [ ]Prieto    MUSCULOSKELETAL:   [ ]Normal   [ ]Weakness  [x]Bed/Wheelchair bound [ ]Edema    NEUROLOGIC:   [ ]No focal deficits  [x] Cognitive impairment  [ ] Dysphagia [ ]Dysarthria [ ] Paresis [ ]Other     SKIN:   [x]Normal   [ ]Pressure ulcer(s)  [ ]Rash    CRITICAL CARE:  [ ] Shock Present  [ ]Septic [ ]Cardiogenic [ ]Neurologic [ ]Hypovolemic  [ ]  Vasopressors [ ]  Inotropes   [ ] Respiratory failure present  [ ] Acute  [ ] Chronic [ ] Hypoxic  [ ] Hypercarbic [ ] Other  [ ] Other organ failure     LABS:                        9.2    12.17 )-----------( 123      ( 17 Dec 2018 07:45 )x             28.8             RADIOLOGY & ADDITIONAL STUDIES:    PROTEIN CALORIE MALNUTRITION: [ ] yes   [ ]no  [x ] PPSV2 < or = 30% [ ] significant weight loss [x ] poor nutritional intake [ ] anasarca [ ] catabolic state   Albumin, Serum: 3.3 g/dL (12-16-18 @ 08:22)   Artificial Nutrition [ ]     REFERRALS:   [ ]Chaplaincy  x] Hospice  [ ]Child Life  [ x]Social Work  [ ]Case management [ ]Holistic Therapy [ ] Physical Therapy [ ] Dietary   Goals of Care Document:

## 2018-12-19 LAB
CULTURE RESULTS: SIGNIFICANT CHANGE UP
SPECIMEN SOURCE: SIGNIFICANT CHANGE UP

## 2018-12-19 PROCEDURE — 71045 X-RAY EXAM CHEST 1 VIEW: CPT | Mod: 26

## 2018-12-19 PROCEDURE — 71045 X-RAY EXAM CHEST 1 VIEW: CPT | Mod: 26,77

## 2018-12-19 PROCEDURE — 99233 SBSQ HOSP IP/OBS HIGH 50: CPT | Mod: GC

## 2018-12-19 RX ORDER — DOCUSATE SODIUM 100 MG
300 CAPSULE ORAL AT BEDTIME
Qty: 0 | Refills: 0 | Status: DISCONTINUED | OUTPATIENT
Start: 2018-12-19 | End: 2018-12-21

## 2018-12-19 RX ORDER — ATENOLOL 25 MG/1
50 TABLET ORAL DAILY
Qty: 0 | Refills: 0 | Status: DISCONTINUED | OUTPATIENT
Start: 2018-12-19 | End: 2018-12-21

## 2018-12-19 RX ORDER — CARBIDOPA AND LEVODOPA 25; 100 MG/1; MG/1
1 TABLET ORAL
Qty: 0 | Refills: 0 | Status: DISCONTINUED | OUTPATIENT
Start: 2018-12-19 | End: 2018-12-21

## 2018-12-19 RX ORDER — SENNA PLUS 8.6 MG/1
15 TABLET ORAL AT BEDTIME
Qty: 0 | Refills: 0 | Status: DISCONTINUED | OUTPATIENT
Start: 2018-12-19 | End: 2018-12-21

## 2018-12-19 RX ORDER — CARBIDOPA AND LEVODOPA 25; 100 MG/1; MG/1
1.5 TABLET ORAL
Qty: 0 | Refills: 0 | Status: DISCONTINUED | OUTPATIENT
Start: 2018-12-19 | End: 2018-12-21

## 2018-12-19 RX ADMIN — CARBIDOPA AND LEVODOPA 1 TABLET(S): 25; 100 TABLET ORAL at 23:33

## 2018-12-19 RX ADMIN — CARBIDOPA AND LEVODOPA 1.5 TABLET(S): 25; 100 TABLET ORAL at 15:39

## 2018-12-19 RX ADMIN — HYDROMORPHONE HYDROCHLORIDE 0.2 MILLIGRAM(S): 2 INJECTION INTRAMUSCULAR; INTRAVENOUS; SUBCUTANEOUS at 23:43

## 2018-12-19 RX ADMIN — HYDROMORPHONE HYDROCHLORIDE 0.2 MILLIGRAM(S): 2 INJECTION INTRAMUSCULAR; INTRAVENOUS; SUBCUTANEOUS at 23:55

## 2018-12-19 RX ADMIN — SODIUM CHLORIDE 10 MILLILITER(S): 9 INJECTION, SOLUTION INTRAVENOUS at 15:41

## 2018-12-19 RX ADMIN — FAMOTIDINE 20 MILLIGRAM(S): 10 INJECTION INTRAVENOUS at 12:16

## 2018-12-19 RX ADMIN — SENNA PLUS 15 MILLILITER(S): 8.6 TABLET ORAL at 21:08

## 2018-12-19 RX ADMIN — HYDROMORPHONE HYDROCHLORIDE 0.2 MILLIGRAM(S): 2 INJECTION INTRAMUSCULAR; INTRAVENOUS; SUBCUTANEOUS at 13:21

## 2018-12-19 RX ADMIN — ATENOLOL 50 MILLIGRAM(S): 25 TABLET ORAL at 15:40

## 2018-12-19 RX ADMIN — SODIUM CHLORIDE 10 MILLILITER(S): 9 INJECTION, SOLUTION INTRAVENOUS at 19:39

## 2018-12-19 RX ADMIN — SODIUM CHLORIDE 40 MILLILITER(S): 9 INJECTION, SOLUTION INTRAVENOUS at 05:52

## 2018-12-19 RX ADMIN — Medication 300 MILLIGRAM(S): at 21:10

## 2018-12-19 RX ADMIN — HYDROMORPHONE HYDROCHLORIDE 0.2 MILLIGRAM(S): 2 INJECTION INTRAMUSCULAR; INTRAVENOUS; SUBCUTANEOUS at 13:50

## 2018-12-19 NOTE — PROGRESS NOTE ADULT - SUBJECTIVE AND OBJECTIVE BOX
GAP TEAM PALLIATIVE CARE UNIT PROGRESS NOTE:      [x] Patient on hospice program.    INDICATION FOR PALLIATIVE CARE UNIT SERVICES: acute illness management, symptom management s/p fall with right acetabular fracture    INTERVAL HPI/OVERNIGHT EVENTS: Patient evaluated at beside. Patient is more arousable today and opens eyes to verbal stimuli. Prieto catheter was placed yesterday and UA was done revealing protein, blood, and RBC in urine but no evidence of UTI. This morning patient had a low grade fever of 100.1. All PO meds were d/noble due to patient's inability to swallow. Family now has concerns that patient has not received his Parkinson meds in 4 days. Will discuss with family possible NGT for medication purposes.     DNR on chart: Yes    Allergies    iodine (Unknown)    Intolerances    Keppra (Other)  MEDICATIONS  (STANDING):  aspirin enteric coated 81 milliGRAM(s) Oral daily  ATENolol  Tablet 50 milliGRAM(s) Oral daily  atorvastatin 40 milliGRAM(s) Oral at bedtime  carbidopa/levodopa  25/100 1.5 Tablet(s) Oral <User Schedule>  carbidopa/levodopa CR 25/100 1 Tablet(s) Oral <User Schedule>  clopidogrel Tablet 75 milliGRAM(s) Oral daily  cyanocobalamin 1000 MICROGram(s) Oral daily  dextrose 5% + sodium chloride 0.45%. 1000 milliLiter(s) (40 mL/Hr) IV Continuous <Continuous>  docusate sodium 100 milliGRAM(s) Oral two times a day  famotidine    Tablet 20 milliGRAM(s) Oral daily  finasteride 5 milliGRAM(s) Oral daily  folic acid 1 milliGRAM(s) Oral daily  QUEtiapine 25 milliGRAM(s) Oral at bedtime    MEDICATIONS  (PRN):  acetaminophen   Tablet .. 650 milliGRAM(s) Oral every 6 hours PRN Mild Pain (1 - 3), Moderate Pain (4 - 6), Severe Pain (7 - 10)  acetaminophen  Suppository .. 650 milliGRAM(s) Rectal every 6 hours PRN Temp greater or equal to 38C (100.4F)  HYDROmorphone  Injectable 0.2 milliGRAM(s) IV Push every 2 hours PRN moderate/severe pain    ITEMS UNCHECKED ARE NOT PRESENT    PRESENT SYMPTOMS: [x]Unable to obtain due to poor mentation     Source if other than patient:  [x]Family   [x]Team     Pain (Impact on QOL):    Location:       Minimal acceptable level (0-10 scale):  Aggravating factors:  Quality:  Radiation:  Severity (0-10 scale):     Dyspnea:                           [ ]Mild [ ]Moderate [ ]Severe  Anxiety:                             [ ]Mild [ ]Moderate [ ]Severe  Fatigue:                             [ ]Mild [ ]Moderate [ ]Severe  Nausea:                             [ ]Mild [ ]Moderate [ ]Severe  Loss of appetite:              [ ]Mild [ ]Moderate [ ]Severe  Constipation:                    [ ]Mild [ ]Moderate [ ]Severe    PAINAD Score:    [email or web address] (Ctrl +  left click to view)  		  Other Symptoms:  [x]All other review of systems negative     Karnofsky Performance Score/Palliative Performance Status Version 2:    30     %        http://palliative.info/resource_material/[email or web address].pdf    PHYSICAL EXAM:    Vital Signs Last 24 Hrs  T(C): 37.4 (18 Dec 2018 08:21), Max: 37.4 (18 Dec 2018 08:21)  T(F): 99.4 (18 Dec 2018 08:21), Max: 99.4 (18 Dec 2018 08:21)  HR: 70 (18 Dec 2018 08:21) (70 - 70)  BP: 120/63 (18 Dec 2018 08:21) (120/63 - 120/63)  BP(mean): --  RR: 20 (18 Dec 2018 08:21) (20 - 20)  SpO2: 93% (18 Dec 2018 08:21) (93% - 93%) I&O's Summary  GENERAL:  [ ]Alert  [ ]Oriented x   [ ]Lethargic  [ ]Cachexia  [ ]Unarousable  [ ]Verbal  [x ]Non-Verbal    Behavioral:   [ ] Anxiety  [x] Delirium- possible hypoactive [ ] Agitation [ ] Other    HEENT:  [x]Normal   [ ]Dry mouth   [ ]ET Tube/Trach  [ ]Oral lesions    PULMONARY:   [x]Clear [ ]Tachypnea  [ ]Audible excessive secretions   [ ]Rhonchi        [ ]Right [ ]Left [ ]Bilateral  [ ]Crackles        [ ]Right [ ]Left [ ]Bilateral  [ ]Wheezing     [ ]Right [ ]Left [ ]Bilateral    CARDIOVASCULAR:    [x]Regular [ ]Irregular [ ]Tachy  [ ]Danny [ ]Murmur [ ]Other +S1 +S2      GASTROINTESTINAL:  [x]Soft  [ ]Distended   [ ]+BS  [x]Non tender [ ]Tender  [ ]PEG [ ]OGT/ NGT   Last BM:   See RN documentation in the EMR which I have reviewed.     GENITOURINARY:  [ ]Normal [ ] Incontinent   [ ]Oliguria/Anuria   [x]Prieto    MUSCULOSKELETAL:   [ ]Normal   [ ]Weakness  [x]Bed/Wheelchair bound [ ]Edema    NEUROLOGIC:   [ ]No focal deficits  [x] Cognitive impairment  [ ] Dysphagia [ ]Dysarthria [ ] Paresis [ ]Other     SKIN:   [x]Normal   [ ]Pressure ulcer(s)  [ ]Rash    CRITICAL CARE:  [ ] Shock Present  [ ]Septic [ ]Cardiogenic [ ]Neurologic [ ]Hypovolemic  [ ]  Vasopressors [ ]  Inotropes   [ ] Respiratory failure present  [ ] Acute  [ ] Chronic [ ] Hypoxic  [ ] Hypercarbic [ ] Other  [ ] Other organ failure     LABS:                        9.2    12.17 )-----------( 123      ( 17 Dec 2018 07:45 )x             28.8             RADIOLOGY & ADDITIONAL STUDIES:    PROTEIN CALORIE MALNUTRITION: [ ] yes   [ ]no  [x ] PPSV2 < or = 30% [ ] significant weight loss [x ] poor nutritional intake [ ] anasarca [ ] catabolic state   Albumin, Serum: 3.3 g/dL (12-16-18 @ 08:22)   Artificial Nutrition [ ]     REFERRALS:   [ ]Chaplaincy  x] Hospice  [ ]Child Life  [ x]Social Work  [ ]Case management [ ]Holistic Therapy [ ] Physical Therapy [ ] Dietary   Goals of Care Document: GAP TEAM PALLIATIVE CARE UNIT PROGRESS NOTE:      [x] Patient on hospice program.    INDICATION FOR PALLIATIVE CARE UNIT SERVICES: acute illness management, symptom management s/p fall with right acetabular fracture    INTERVAL HPI/OVERNIGHT EVENTS: Patient evaluated at beside. Patient is more arousable today and opens eyes to verbal stimuli. Prieto catheter was placed yesterday and UA was done revealing protein, blood, and RBC in urine but no evidence of UTI. This morning patient had a low grade fever of 100.1. All PO meds were d/noble due to patient's inability to swallow. Family now has concerns that patient has not received his Parkinson meds in 4 days. Will discuss with family possible NGT for medication purposes.     DNR on chart: Yes    Allergies    iodine (Unknown)    Intolerances    Keppra (Other)  MEDICATIONS  (STANDING):  aspirin enteric coated 81 milliGRAM(s) Oral daily  ATENolol  Tablet 50 milliGRAM(s) Oral daily  atorvastatin 40 milliGRAM(s) Oral at bedtime  carbidopa/levodopa  25/100 1.5 Tablet(s) Oral <User Schedule>  carbidopa/levodopa CR 25/100 1 Tablet(s) Oral <User Schedule>  clopidogrel Tablet 75 milliGRAM(s) Oral daily  cyanocobalamin 1000 MICROGram(s) Oral daily  dextrose 5% + sodium chloride 0.45%. 1000 milliLiter(s) (40 mL/Hr) IV Continuous <Continuous>  docusate sodium 100 milliGRAM(s) Oral two times a day  famotidine    Tablet 20 milliGRAM(s) Oral daily  finasteride 5 milliGRAM(s) Oral daily  folic acid 1 milliGRAM(s) Oral daily  QUEtiapine 25 milliGRAM(s) Oral at bedtime    MEDICATIONS  (PRN):  acetaminophen   Tablet .. 650 milliGRAM(s) Oral every 6 hours PRN Mild Pain (1 - 3), Moderate Pain (4 - 6), Severe Pain (7 - 10)  acetaminophen  Suppository .. 650 milliGRAM(s) Rectal every 6 hours PRN Temp greater or equal to 38C (100.4F)  HYDROmorphone  Injectable 0.2 milliGRAM(s) IV Push every 2 hours PRN moderate/severe pain    ITEMS UNCHECKED ARE NOT PRESENT    PRESENT SYMPTOMS: [x]Unable to obtain due to poor mentation     Source if other than patient:  [x]Family   [x]Team     Pain (Impact on QOL):    Location:       Minimal acceptable level (0-10 scale):  Aggravating factors:  Quality:  Radiation:  Severity (0-10 scale):     Dyspnea:                           [ ]Mild [ ]Moderate [ ]Severe  Anxiety:                             [ ]Mild [ ]Moderate [ ]Severe  Fatigue:                             [ ]Mild [ ]Moderate [ ]Severe  Nausea:                             [ ]Mild [ ]Moderate [ ]Severe  Loss of appetite:              [ ]Mild [ ]Moderate [ ]Severe  Constipation:                    [ ]Mild [ ]Moderate [ ]Severe    PAINAD Score:    [email or web address]/[email or web address] (Ctrl +  left click to view)  		  Other Symptoms:  [x]All other review of systems negative     Karnofsky Performance Score/Palliative Performance Status Version 2:    30     %        http://palliative.info/resource_material/[email or web address].pdf    PHYSICAL EXAM:    Vital Signs Last 24 Hrs  T(C): 37.4 (18 Dec 2018 08:21), Max: 37.4 (18 Dec 2018 08:21)  T(F): 99.4 (18 Dec 2018 08:21), Max: 99.4 (18 Dec 2018 08:21)  HR: 70 (18 Dec 2018 08:21) (70 - 70)  BP: 120/63 (18 Dec 2018 08:21) (120/63 - 120/63)  BP(mean): --  RR: 20 (18 Dec 2018 08:21) (20 - 20)  SpO2: 93% (18 Dec 2018 08:21) (93% - 93%) I&O's Summary  GENERAL:  [ ]Alert  [ ]Oriented x   [ ]Lethargic  [ ]Cachexia  [ ]Unarousable  [ ]Verbal  [x ]Non-Verbal    Behavioral:   [ ] Anxiety  [x] Delirium- possible hypoactive [ ] Agitation [ ] Other    HEENT:  [x]Normal   [ ]Dry mouth   [ ]ET Tube/Trach  [ ]Oral lesions    PULMONARY:   [x]Clear [ ]Tachypnea  [ ]Audible excessive secretions   [ ]Rhonchi        [ ]Right [ ]Left [ ]Bilateral  [ ]Crackles        [ ]Right [ ]Left [ ]Bilateral  [ ]Wheezing     [ ]Right [ ]Left [ ]Bilateral    CARDIOVASCULAR:    [x]Regular [ ]Irregular [ ]Tachy  [ ]Danny [ ]Murmur [ ]Other +S1 +S2      GASTROINTESTINAL:  [x]Soft  [ ]Distended   [ ]+BS  [x]Non tender [ ]Tender  [ ]PEG [ ]OGT/ NGT   Last BM:   See RN documentation in the EMR which I have reviewed.     GENITOURINARY:  [ ]Normal [ ] Incontinent   [ ]Oliguria/Anuria   [x]Prieto    MUSCULOSKELETAL:   [ ]Normal   [ ]Weakness  [x]Bed/Wheelchair bound [ ]Edema    NEUROLOGIC:   [ ]No focal deficits  [x] Cognitive impairment  [ ] Dysphagia [ ]Dysarthria [ ] Paresis [ ]Other     SKIN:   [x]Normal   [ ]Pressure ulcer(s)  [ ]Rash    CRITICAL CARE:  [ ] Shock Present  [ ]Septic [ ]Cardiogenic [ ]Neurologic [ ]Hypovolemic  [ ]  Vasopressors [ ]  Inotropes   [ ] Respiratory failure present  [ ] Acute  [ ] Chronic [ ] Hypoxic  [ ] Hypercarbic [ ] Other  [ ] Other organ failure     LABS:                        9.2    12.17 )-----------( 123      ( 17 Dec 2018 07:45 )x             28.8             RADIOLOGY & ADDITIONAL STUDIES: < from: Xray Chest 1 View- PORTABLE-Urgent (12.19.18 @ 15:25) >  Impression:    The heart is normal in size. Small left pleural effusion. Otherwise the   lungs appear to be clear. NG tube is in good position on the current   study. A pacer is in good position as well. Multiple metallic clips are   seen in the mediastinum from previous surgery.                    KARY MANZANARES M.D., ATTENDING RADIOLOGIST  This document has been electronically signed. Dec 19 2018  3:27PM          < end of copied text >      PROTEIN CALORIE MALNUTRITION: [ ] yes   [ ]no  [x ] PPSV2 < or = 30% [ ] significant weight loss [x ] poor nutritional intake [ ] anasarca [ ] catabolic state   Albumin, Serum: 3.3 g/dL (12-16-18 @ 08:22)   Artificial Nutrition [ ]     REFERRALS:   [ ]Chaplaincy  x] Hospice  [ ]Child Life  [ x]Social Work  [ ]Case management [ ]Holistic Therapy [ ] Physical Therapy [ ] Dietary   Goals of Care Document:

## 2018-12-19 NOTE — PROGRESS NOTE ADULT - PROBLEM SELECTOR PLAN 6
Patient in PCU under Hospice for acute illness and symptom management. No surgical intervention offered for right acetabular/pelvic fracture- pending PT evaluation. Will monitor leukocytosis off antibiotics- consider septic work up if any change in clinical condition (fever, symptoms). Pain is well controlled- no opioid doses required. Continue all other home medications. Advance diet as tolerated. Hospice team to follow up in AM. Prieto placed 12/18/18.

## 2018-12-19 NOTE — PROGRESS NOTE ADULT - ATTENDING COMMENTS
I have personally seen and examined this patient and agree with the above assessment and plan, which I have reviewed and edited where appropriate.   KO feed tube placed to give medication in the hopes that it will help with tremors and swallowing.  Time limited trial.  Placed without difficulty and confirmed by cxr.

## 2018-12-19 NOTE — PROGRESS NOTE ADULT - PROBLEM SELECTOR PLAN 2
-Likely reactive in setting of acute fracture, downtrending  -To observe off antibiotics.   - UA done revealing protein, blood and RBC in urine. No evidence of UTI at this point  -if fever, will consider cultures at that time  -Will repeat CBC, consider septic work up PRN if any change in hemodynamic status, fevers, not resolving leukocytosis.

## 2018-12-19 NOTE — PROGRESS NOTE ADULT - PROBLEM SELECTOR PLAN 5
-c/w Aspirin  -c/w Plavix  - Lipitor d/c due to patient's inability to swallow PO meds  -pending clinical course, can likely discuss with family discontinuation of non essential medication.  - Atenolol d/c due to patient's inability to swallow PO meds

## 2018-12-19 NOTE — PROGRESS NOTE ADULT - PROBLEM SELECTOR PROBLEM 1
Closed nondisplaced fracture of right acetabulum, unspecified portion of acetabulum, initial encounter None

## 2018-12-19 NOTE — PROGRESS NOTE ADULT - PROBLEM SELECTOR PLAN 4
- Sinemet d/c due to patient's inability to swallow PO meds  -c/w Quetiapine with parameters for sedation  -Pt on Nuplazid (pimavanserin); suggested family bring in med  -HCN contacted by ER, to further discuss mgt

## 2018-12-20 PROCEDURE — 99233 SBSQ HOSP IP/OBS HIGH 50: CPT | Mod: GC

## 2018-12-20 RX ORDER — ACETAMINOPHEN 500 MG
650 TABLET ORAL EVERY 6 HOURS
Qty: 0 | Refills: 0 | Status: DISCONTINUED | OUTPATIENT
Start: 2018-12-20 | End: 2018-12-22

## 2018-12-20 RX ADMIN — HYDROMORPHONE HYDROCHLORIDE 0.2 MILLIGRAM(S): 2 INJECTION INTRAMUSCULAR; INTRAVENOUS; SUBCUTANEOUS at 15:00

## 2018-12-20 RX ADMIN — SODIUM CHLORIDE 10 MILLILITER(S): 9 INJECTION, SOLUTION INTRAVENOUS at 11:18

## 2018-12-20 RX ADMIN — Medication 650 MILLIGRAM(S): at 14:34

## 2018-12-20 RX ADMIN — Medication 300 MILLIGRAM(S): at 22:44

## 2018-12-20 RX ADMIN — CARBIDOPA AND LEVODOPA 1.5 TABLET(S): 25; 100 TABLET ORAL at 06:03

## 2018-12-20 RX ADMIN — CARBIDOPA AND LEVODOPA 1.5 TABLET(S): 25; 100 TABLET ORAL at 12:23

## 2018-12-20 RX ADMIN — SENNA PLUS 15 MILLILITER(S): 8.6 TABLET ORAL at 22:43

## 2018-12-20 RX ADMIN — FAMOTIDINE 20 MILLIGRAM(S): 10 INJECTION INTRAVENOUS at 11:18

## 2018-12-20 RX ADMIN — HYDROMORPHONE HYDROCHLORIDE 0.2 MILLIGRAM(S): 2 INJECTION INTRAMUSCULAR; INTRAVENOUS; SUBCUTANEOUS at 20:32

## 2018-12-20 RX ADMIN — CARBIDOPA AND LEVODOPA 1.5 TABLET(S): 25; 100 TABLET ORAL at 18:15

## 2018-12-20 RX ADMIN — HYDROMORPHONE HYDROCHLORIDE 0.2 MILLIGRAM(S): 2 INJECTION INTRAMUSCULAR; INTRAVENOUS; SUBCUTANEOUS at 20:18

## 2018-12-20 RX ADMIN — HYDROMORPHONE HYDROCHLORIDE 0.2 MILLIGRAM(S): 2 INJECTION INTRAMUSCULAR; INTRAVENOUS; SUBCUTANEOUS at 14:34

## 2018-12-20 RX ADMIN — Medication 650 MILLIGRAM(S): at 15:28

## 2018-12-20 RX ADMIN — ATENOLOL 50 MILLIGRAM(S): 25 TABLET ORAL at 05:28

## 2018-12-20 NOTE — PROGRESS NOTE ADULT - PROBLEM SELECTOR PLAN 3
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  -gentle hydration -gentle hydration/avoid nephrotoxins.

## 2018-12-20 NOTE — PROGRESS NOTE ADULT - PROBLEM SELECTOR PLAN 4
- Sinemet given via NGT  -c/w Quetiapine with parameters for sedation  -Pt on Nuplazid (pimavanserin); suggested family bring in med  -HCN contacted by ER, to further discuss mgt - Sinemet given via NGT  -c/w Quetiapine with parameters for sedation  -Pt on Nuplazid (pimavanserin); suggested family bring in med

## 2018-12-20 NOTE — PROGRESS NOTE ADULT - SUBJECTIVE AND OBJECTIVE BOX
GAP TEAM PALLIATIVE CARE UNIT PROGRESS NOTE:      [x] Patient on hospice program.    INDICATION FOR PALLIATIVE CARE UNIT SERVICES: acute illness management, symptom management s/p fall with right acetabular fracture    INTERVAL HPI/OVERNIGHT EVENTS: Patient evaluated at [facility]. NGT was placed yesterday 12/19/18 in order for patient to receive oral meds and placement confirmed with CXR which also revealed a small left pleural effusion. Fluids were held last night. Patient has received atenolol due to elevated BP, Senna, and 3 doses of Sinemet all via NGT. Also was given PRN dose of dilaudid last night. Patient is still running low grade fever of 99.3. Urine culture revealed normal alistair. No changes in mental status at this time, patient is still unarousbale.     DNR on chart: Yes    Allergies    iodine (Unknown)    Intolerances    Keppra (Other)  MEDICATIONS  (STANDING):  aspirin enteric coated 81 milliGRAM(s) Oral daily  ATENolol  Tablet 50 milliGRAM(s) Oral daily  atorvastatin 40 milliGRAM(s) Oral at bedtime  carbidopa/levodopa  25/100 1.5 Tablet(s) Oral <User Schedule>  carbidopa/levodopa CR 25/100 1 Tablet(s) Oral <User Schedule>  clopidogrel Tablet 75 milliGRAM(s) Oral daily  cyanocobalamin 1000 MICROGram(s) Oral daily  dextrose 5% + sodium chloride 0.45%. 1000 milliLiter(s) (40 mL/Hr) IV Continuous <Continuous>  docusate sodium 100 milliGRAM(s) Oral two times a day  famotidine    Tablet 20 milliGRAM(s) Oral daily  finasteride 5 milliGRAM(s) Oral daily  folic acid 1 milliGRAM(s) Oral daily  QUEtiapine 25 milliGRAM(s) Oral at bedtime    MEDICATIONS  (PRN):  acetaminophen   Tablet .. 650 milliGRAM(s) Oral every 6 hours PRN Mild Pain (1 - 3), Moderate Pain (4 - 6), Severe Pain (7 - 10)  acetaminophen  Suppository .. 650 milliGRAM(s) Rectal every 6 hours PRN Temp greater or equal to 38C (100.4F)  HYDROmorphone  Injectable 0.2 milliGRAM(s) IV Push every 2 hours PRN moderate/severe pain    ITEMS UNCHECKED ARE NOT PRESENT    PRESENT SYMPTOMS: [x]Unable to obtain due to poor mentation     Source if other than patient:  [x]Family   [x]Team     Pain (Impact on QOL):    Location:       Minimal acceptable level (0-10 scale):  Aggravating factors:  Quality:  Radiation:  Severity (0-10 scale):     Dyspnea:                           [ ]Mild [ ]Moderate [ ]Severe  Anxiety:                             [ ]Mild [ ]Moderate [ ]Severe  Fatigue:                             [ ]Mild [ ]Moderate [ ]Severe  Nausea:                             [ ]Mild [ ]Moderate [ ]Severe  Loss of appetite:              [ ]Mild [ ]Moderate [ ]Severe  Constipation:                    [ ]Mild [ ]Moderate [ ]Severe    PAINAD Score:    [email or web address] (Ctrl +  left click to view)  		  Other Symptoms:  [x]All other review of systems negative     Karnofsky Performance Score/Palliative Performance Status Version 2:    30     %        http://palliative.info/resource_material/[email or web address].pdf    PHYSICAL EXAM:    Vital Signs Last 24 Hrs  T(C): 37.4 (18 Dec 2018 08:21), Max: 37.4 (18 Dec 2018 08:21)  T(F): 99.4 (18 Dec 2018 08:21), Max: 99.4 (18 Dec 2018 08:21)  HR: 70 (18 Dec 2018 08:21) (70 - 70)  BP: 120/63 (18 Dec 2018 08:21) (120/63 - 120/63)  BP(mean): --  RR: 20 (18 Dec 2018 08:21) (20 - 20)  SpO2: 93% (18 Dec 2018 08:21) (93% - 93%) I&O's Summary    GENERAL:  [ ]Alert  [ ]Oriented x   [ ]Lethargic  [ ]Cachexia  [ ]Unarousable  [ ]Verbal  [x ]Non-Verbal    Behavioral:   [ ] Anxiety  [x] Delirium- possible hypoactive [ ] Agitation [ ] Other    HEENT:  [x]Normal   [ ]Dry mouth   [ ]ET Tube/Trach  [ ]Oral lesions    PULMONARY:   [x]Clear [ ]Tachypnea  [ ]Audible excessive secretions   [ ]Rhonchi        [ ]Right [ ]Left [ ]Bilateral  [ ]Crackles        [ ]Right [ ]Left [ ]Bilateral  [ ]Wheezing     [ ]Right [ ]Left [ ]Bilateral    CARDIOVASCULAR:    [x]Regular [ ]Irregular [ ]Tachy  [ ]Danny [ ]Murmur [ ]Other +S1 +S2      GASTROINTESTINAL:  [x]Soft  [ ]Distended   [ ]+BS  [x]Non tender [ ]Tender  [ ]PEG [ ]OGT/ NGT   Last BM:   See RN documentation in the EMR which I have reviewed.     GENITOURINARY:  [ ]Normal [ ] Incontinent   [ ]Oliguria/Anuria   [x]Prieto    MUSCULOSKELETAL:   [ ]Normal   [ ]Weakness  [x]Bed/Wheelchair bound [ ]Edema    NEUROLOGIC:   [ ]No focal deficits  [x] Cognitive impairment  [ ] Dysphagia [ ]Dysarthria [ ] Paresis [ ]Other     SKIN:   [x]Normal   [ ]Pressure ulcer(s)  [ ]Rash    CRITICAL CARE:  [ ] Shock Present  [ ]Septic [ ]Cardiogenic [ ]Neurologic [ ]Hypovolemic  [ ]  Vasopressors [ ]  Inotropes   [ ] Respiratory failure present  [ ] Acute  [ ] Chronic [ ] Hypoxic  [ ] Hypercarbic [ ] Other  [ ] Other organ failure     LABS:                        9.2    12.17 )-----------( 123      ( 17 Dec 2018 07:45 )x             28.8             RADIOLOGY & ADDITIONAL STUDIES: < from: Xray Chest 1 View- PORTABLE-Urgent (12.19.18 @ 15:25) >  Impression:    The heart is normal in size. Small left pleural effusion. Otherwise the   lungs appear to be clear. NG tube is in good position on the current   study. A pacer is in good position as well. Multiple metallic clips are   seen in the mediastinum from previous surgery.    KARY MANZANARES M.D., ATTENDING RADIOLOGIST  This document has been electronically signed. Dec 19 2018  3:27PM          < end of copied text >      PROTEIN CALORIE MALNUTRITION: [ ] yes   [ ]no  [x ] PPSV2 < or = 30% [ ] significant weight loss [x ] poor nutritional intake [ ] anasarca [ ] catabolic state   Albumin, Serum: 3.3 g/dL (12-16-18 @ 08:22)   Artificial Nutrition [ ]     REFERRALS:   [ ]Chaplaincy  [x] Hospice  [ ]Child Life  [ x]Social Work  [ ]Case management [ ]Holistic Therapy [ ] Physical Therapy [ ] Dietary   Goals of Care Document:

## 2018-12-20 NOTE — PROGRESS NOTE ADULT - PROBLEM SELECTOR PLAN 6
Patient in PCU under Hospice for acute illness and symptom management. No surgical intervention offered for right acetabular/pelvic fracture- pending PT evaluation. Will monitor leukocytosis off antibiotics- consider septic work up if any change in clinical condition (fever, symptoms). Pain is well controlled- no opioid doses required. Continue all other home medications. Advance diet as tolerated. Hospice team to follow up in AM. Prieto placed 12/18/18. NGT placed 12/19/18 in order to administer PO meds Patient in PCU under Hospice for acute illness and symptom management. No surgical intervention offered for right acetabular/pelvic fracture.   Prieto placed 12/18/18. NGT placed 12/19/18 in order to administer PO meds.

## 2018-12-20 NOTE — PROGRESS NOTE ADULT - ATTENDING COMMENTS
I have personally seen and examined this patient and agree with the above assessment and plan, which I have reviewed and edited where appropriate.   No real change with introduction of parkinson's medication.  Repeat labs in AM.  Continued discussion with family.

## 2018-12-20 NOTE — PROGRESS NOTE ADULT - PROBLEM SELECTOR PLAN 1
-s/p witnessed fall  -XR hip/CT confirm acetabular fx; rest of trauma imaging without acute findings of fx, dislocation, or stroke  -Ortho eval appreciated, no acute surgical intervention planned  -pain control; s/p tylenol, morphine, dilaudid, and toradol in ER.  Given camille, will avoid further toradol.   continue dilaudid 0.5 IV q6 prn severe pain  -Incentive Spirometry  -PT consult; pt will be toe touch weight bearing status on RLE  -Plan for repeat o/p XR in 1 week, f/u with DR. Rodriguez -s/p witnessed fall  -Ortho eval appreciated, no acute surgical intervention planned for right acetabular hip fx.  continue dilaudid 0.2 IV q2 prn severe pain  -Incentive Spirometry  -PT consult; pt will be toe touch weight bearing status on RLE  -Plan for repeat o/p XR in 1 week, f/u with DR. Rodriguez

## 2018-12-20 NOTE — PROGRESS NOTE ADULT - PROBLEM SELECTOR PLAN 5
-c/w Aspirin  -c/w Plavix  -c/w Lipitor  -pending clinical course, can likely discuss with family discontinuation of non essential medication.  - Atenolol given via NGT

## 2018-12-20 NOTE — PROGRESS NOTE ADULT - PROBLEM SELECTOR PLAN 2
-Likely reactive in setting of acute fracture, downtrending  -To observe off antibiotics.   - UA done revealing protein, blood and RBC in urine. No evidence of UTI at this point  - Urine cultures revealed normal alistair  -Will repeat CBC, consider septic work up PRN if any change in hemodynamic status, fevers, not resolving leukocytosis. -Likely reactive in setting of acute fracture, downtrending  Urine cultures negative, cxr shows small pleural effusion, no obvious infiltrate

## 2018-12-21 PROCEDURE — 99233 SBSQ HOSP IP/OBS HIGH 50: CPT | Mod: GC

## 2018-12-21 PROCEDURE — 99497 ADVNCD CARE PLAN 30 MIN: CPT | Mod: 25

## 2018-12-21 RX ORDER — ROBINUL 0.2 MG/ML
0.4 INJECTION INTRAMUSCULAR; INTRAVENOUS EVERY 6 HOURS
Qty: 0 | Refills: 0 | Status: DISCONTINUED | OUTPATIENT
Start: 2018-12-21 | End: 2018-12-22

## 2018-12-21 RX ORDER — HYDROMORPHONE HYDROCHLORIDE 2 MG/ML
0.5 INJECTION INTRAMUSCULAR; INTRAVENOUS; SUBCUTANEOUS
Qty: 0 | Refills: 0 | Status: DISCONTINUED | OUTPATIENT
Start: 2018-12-21 | End: 2018-12-22

## 2018-12-21 RX ORDER — HYDROMORPHONE HYDROCHLORIDE 2 MG/ML
0.2 INJECTION INTRAMUSCULAR; INTRAVENOUS; SUBCUTANEOUS
Qty: 100 | Refills: 0 | Status: DISCONTINUED | OUTPATIENT
Start: 2018-12-21 | End: 2018-12-22

## 2018-12-21 RX ORDER — SODIUM CHLORIDE 9 MG/ML
1000 INJECTION INTRAMUSCULAR; INTRAVENOUS; SUBCUTANEOUS
Qty: 0 | Refills: 0 | Status: DISCONTINUED | OUTPATIENT
Start: 2018-12-21 | End: 2018-12-22

## 2018-12-21 RX ORDER — HYDROMORPHONE HYDROCHLORIDE 2 MG/ML
0.2 INJECTION INTRAMUSCULAR; INTRAVENOUS; SUBCUTANEOUS EVERY 4 HOURS
Qty: 0 | Refills: 0 | Status: DISCONTINUED | OUTPATIENT
Start: 2018-12-21 | End: 2018-12-21

## 2018-12-21 RX ADMIN — HYDROMORPHONE HYDROCHLORIDE 0.2 MILLIGRAM(S): 2 INJECTION INTRAMUSCULAR; INTRAVENOUS; SUBCUTANEOUS at 02:17

## 2018-12-21 RX ADMIN — HYDROMORPHONE HYDROCHLORIDE 0.2 MILLIGRAM(S): 2 INJECTION INTRAMUSCULAR; INTRAVENOUS; SUBCUTANEOUS at 12:10

## 2018-12-21 RX ADMIN — HYDROMORPHONE HYDROCHLORIDE 0.2 MILLIGRAM(S): 2 INJECTION INTRAMUSCULAR; INTRAVENOUS; SUBCUTANEOUS at 01:59

## 2018-12-21 RX ADMIN — HYDROMORPHONE HYDROCHLORIDE 0.2 MILLIGRAM(S): 2 INJECTION INTRAMUSCULAR; INTRAVENOUS; SUBCUTANEOUS at 19:08

## 2018-12-21 RX ADMIN — SODIUM CHLORIDE 10 MILLILITER(S): 9 INJECTION INTRAMUSCULAR; INTRAVENOUS; SUBCUTANEOUS at 20:56

## 2018-12-21 RX ADMIN — CARBIDOPA AND LEVODOPA 1.5 TABLET(S): 25; 100 TABLET ORAL at 12:09

## 2018-12-21 RX ADMIN — HYDROMORPHONE HYDROCHLORIDE 0.2 MILLIGRAM(S): 2 INJECTION INTRAMUSCULAR; INTRAVENOUS; SUBCUTANEOUS at 17:33

## 2018-12-21 RX ADMIN — FAMOTIDINE 20 MILLIGRAM(S): 10 INJECTION INTRAVENOUS at 12:09

## 2018-12-21 RX ADMIN — Medication 0.25 MILLIGRAM(S): at 21:14

## 2018-12-21 RX ADMIN — CARBIDOPA AND LEVODOPA 1.5 TABLET(S): 25; 100 TABLET ORAL at 06:21

## 2018-12-21 RX ADMIN — ATENOLOL 50 MILLIGRAM(S): 25 TABLET ORAL at 06:21

## 2018-12-21 RX ADMIN — CARBIDOPA AND LEVODOPA 1 TABLET(S): 25; 100 TABLET ORAL at 02:02

## 2018-12-21 RX ADMIN — HYDROMORPHONE HYDROCHLORIDE 0.2 MG/HR: 2 INJECTION INTRAMUSCULAR; INTRAVENOUS; SUBCUTANEOUS at 20:56

## 2018-12-21 NOTE — PROGRESS NOTE ADULT - SUBJECTIVE AND OBJECTIVE BOX
GAP TEAM PALLIATIVE CARE UNIT PROGRESS NOTE:      [x] Patient on hospice program.    INDICATION FOR PALLIATIVE CARE UNIT SERVICES: acute illness management, symptom management s/p fall with right acetabular fracture    INTERVAL HPI/OVERNIGHT EVENTS: Patient had a fever of 100.1 yesterday and was given Tylenol via NGT. Patient was given two doses of dilaudid PRN. He was given senna and colace last night and had a bowel movement early this morning. Atenolol and Sinemet were given but no change in mental status. Patient was pulling at the NGT last night so possible removal today. Will discuss with family that mental status is not improving despite administering Sinemet and plans for either in patient hospice or continue with home hospice.     DNR on chart: Yes    Allergies    iodine (Unknown)    Intolerances    Keppra (Other)  MEDICATIONS  (STANDING):  aspirin enteric coated 81 milliGRAM(s) Oral daily  ATENolol  Tablet 50 milliGRAM(s) Oral daily  atorvastatin 40 milliGRAM(s) Oral at bedtime  carbidopa/levodopa  25/100 1.5 Tablet(s) Oral <User Schedule>  carbidopa/levodopa CR 25/100 1 Tablet(s) Oral <User Schedule>  clopidogrel Tablet 75 milliGRAM(s) Oral daily  cyanocobalamin 1000 MICROGram(s) Oral daily  dextrose 5% + sodium chloride 0.45%. 1000 milliLiter(s) (40 mL/Hr) IV Continuous <Continuous>  docusate sodium 100 milliGRAM(s) Oral two times a day  famotidine    Tablet 20 milliGRAM(s) Oral daily  finasteride 5 milliGRAM(s) Oral daily  folic acid 1 milliGRAM(s) Oral daily  QUEtiapine 25 milliGRAM(s) Oral at bedtime    MEDICATIONS  (PRN):  acetaminophen   Tablet .. 650 milliGRAM(s) Oral every 6 hours PRN Mild Pain (1 - 3), Moderate Pain (4 - 6), Severe Pain (7 - 10)  acetaminophen  Suppository .. 650 milliGRAM(s) Rectal every 6 hours PRN Temp greater or equal to 38C (100.4F)  HYDROmorphone  Injectable 0.2 milliGRAM(s) IV Push every 2 hours PRN moderate/severe pain    ITEMS UNCHECKED ARE NOT PRESENT    PRESENT SYMPTOMS: [x]Unable to obtain due to poor mentation     Source if other than patient:  [x]Family   [x]Team     Pain (Impact on QOL):    Location:       Minimal acceptable level (0-10 scale):  Aggravating factors:  Quality:  Radiation:  Severity (0-10 scale):     Dyspnea:                           [ ]Mild [ ]Moderate [ ]Severe  Anxiety:                             [ ]Mild [ ]Moderate [ ]Severe  Fatigue:                             [ ]Mild [ ]Moderate [ ]Severe  Nausea:                             [ ]Mild [ ]Moderate [ ]Severe  Loss of appetite:              [ ]Mild [ ]Moderate [ ]Severe  Constipation:                    [ ]Mild [ ]Moderate [ ]Severe    PAINAD Score:    http://geriatrictoolkit.[email or web address]/painad.pdf (Ctrl +  left click to view)  		  Other Symptoms:  [x]All other review of systems negative     Karnofsky Performance Score/Palliative Performance Status Version 2:    30     %        http://palliative.info/resource_material/PPSv2.pdf    PHYSICAL EXAM:    Vital Signs Last 24 Hrs  Temperature  Temp (F): 99.5 Degrees F  Temp (C) Temp (C): 37.5 Degrees C  Temp site Temp Site: axillary    Heart Rate  Heart Rate Heart Rate (beats/min): 71 /min  Heart Rate Method: noninvasive blood pressure monitor    Noninvasive Blood Pressure  BP Systolic Systolic: 155 mm Hg  BP Diastolic Diastolic (mm Hg): 71 mm Hg  Blood Pressure - Site Site: right upper arm  Blood Pressure - Method Method: electronic    Respiratory/Pulse Oximetry  Respiration Rate (breaths/min) Respiration Rate (breaths/min): 18 /min  SpO2 (%) SpO2 (%): 96 %  O2 delivery Patient On: supplemental O2    GENERAL:  [ ]Alert  [ ]Oriented x   [ ]Lethargic  [ ]Cachexia  [ ]Unarousable  [ ]Verbal  [x ]Non-Verbal    Behavioral:   [ ] Anxiety  [x] Delirium- possible hypoactive [ ] Agitation [ ] Other    HEENT:  [x]Normal   [ ]Dry mouth   [ ]ET Tube/Trach  [ ]Oral lesions    PULMONARY:   [x]Clear [ ]Tachypnea  [ ]Audible excessive secretions   [ ]Rhonchi        [ ]Right [ ]Left [ ]Bilateral  [ ]Crackles        [ ]Right [ ]Left [ ]Bilateral  [ ]Wheezing     [ ]Right [ ]Left [ ]Bilateral    CARDIOVASCULAR:    [x]Regular [ ]Irregular [ ]Tachy  [ ]Danny [ ]Murmur [ ]Other +S1 +S2      GASTROINTESTINAL:  [x]Soft  [ ]Distended   [ ]+BS  [x]Non tender [ ]Tender  [ ]PEG [ ]OGT/ NGT   Last BM:   See RN documentation in the EMR which I have reviewed.     GENITOURINARY:  [ ]Normal [ ] Incontinent   [ ]Oliguria/Anuria   [x]Prieto    MUSCULOSKELETAL:   [ ]Normal   [ ]Weakness  [x]Bed/Wheelchair bound [ ]Edema    NEUROLOGIC:   [ ]No focal deficits  [x] Cognitive impairment  [ ] Dysphagia [ ]Dysarthria [ ] Paresis [ ]Other     SKIN:   [x]Normal   [ ]Pressure ulcer(s)  [ ]Rash    CRITICAL CARE:  [ ] Shock Present  [ ]Septic [ ]Cardiogenic [ ]Neurologic [ ]Hypovolemic  [ ]  Vasopressors [ ]  Inotropes   [ ] Respiratory failure present  [ ] Acute  [ ] Chronic [ ] Hypoxic  [ ] Hypercarbic [ ] Other  [ ] Other organ failure     LABS:                        9.2    12.17 )-----------( 123      ( 17 Dec 2018 07:45 )x             28.8         RADIOLOGY & ADDITIONAL STUDIES: < from: Xray Chest 1 View- PORTABLE-Urgent (12.19.18 @ 15:25) >  Impression:    The heart is normal in size. Small left pleural effusion. Otherwise the   lungs appear to be clear. NG tube is in good position on the current   study. A pacer is in good position as well. Multiple metallic clips are   seen in the mediastinum from previous surgery.    KARY MANZANARES M.D., ATTENDING RADIOLOGIST  This document has been electronically signed. Dec 19 2018  3:27PM          < end of copied text >      PROTEIN CALORIE MALNUTRITION: [ ] yes   [ ]no  [x ] PPSV2 < or = 30% [ ] significant weight loss [x ] poor nutritional intake [ ] anasarca [ ] catabolic state   Albumin, Serum: 3.3 g/dL (12-16-18 @ 08:22)   Artificial Nutrition [ ]     REFERRALS:   [ ]Chaplaincy  [x] Hospice  [ ]Child Life  [ x]Social Work  [ ]Case management [ ]Holistic Therapy [ ] Physical Therapy [ ] Dietary   Goals of Care Document: GAP TEAM PALLIATIVE CARE UNIT PROGRESS NOTE:      [x] Patient on hospice program.    INDICATION FOR PALLIATIVE CARE UNIT SERVICES: acute illness management, symptom management s/p fall with right acetabular fracture    INTERVAL HPI/OVERNIGHT EVENTS: Patient had a fever of 100.1 yesterday and was given Tylenol via NGT. Patient was given two doses of dilaudid PRN. He was given senna and colace last night and had a bowel movement early this morning. Atenolol and Sinemet were given but no change in mental status. Patient was pulling at the NGT last night. Will discuss with family that mental status is not improving despite administering Sinemet and what the next steps should be.  DNR on chart: Yes    Allergies    iodine (Unknown)    Intolerances    Keppra (Other)  MEDICATIONS  (STANDING):  aspirin enteric coated 81 milliGRAM(s) Oral daily  ATENolol  Tablet 50 milliGRAM(s) Oral daily  atorvastatin 40 milliGRAM(s) Oral at bedtime  carbidopa/levodopa  25/100 1.5 Tablet(s) Oral <User Schedule>  carbidopa/levodopa CR 25/100 1 Tablet(s) Oral <User Schedule>  clopidogrel Tablet 75 milliGRAM(s) Oral daily  cyanocobalamin 1000 MICROGram(s) Oral daily  dextrose 5% + sodium chloride 0.45%. 1000 milliLiter(s) (40 mL/Hr) IV Continuous <Continuous>  docusate sodium 100 milliGRAM(s) Oral two times a day  famotidine    Tablet 20 milliGRAM(s) Oral daily  finasteride 5 milliGRAM(s) Oral daily  folic acid 1 milliGRAM(s) Oral daily  QUEtiapine 25 milliGRAM(s) Oral at bedtime    MEDICATIONS  (PRN):  acetaminophen   Tablet .. 650 milliGRAM(s) Oral every 6 hours PRN Mild Pain (1 - 3), Moderate Pain (4 - 6), Severe Pain (7 - 10)  acetaminophen  Suppository .. 650 milliGRAM(s) Rectal every 6 hours PRN Temp greater or equal to 38C (100.4F)  HYDROmorphone  Injectable 0.2 milliGRAM(s) IV Push every 2 hours PRN moderate/severe pain    ITEMS UNCHECKED ARE NOT PRESENT    PRESENT SYMPTOMS: [x]Unable to obtain due to poor mentation     Source if other than patient:  [x]Family   [x]Team     Pain (Impact on QOL):  decreased QOL	  Location:     right hip  Minimal acceptable level (0-10 scale):  Aggravating factors:  movement  Quality:  Radiation:  Severity (0-10 scale):     Dyspnea:                           [ ]Mild [ ]Moderate [ ]Severe  Anxiety:                             [ ]Mild [ ]Moderate [ ]Severe  Fatigue:                             [ ]Mild [ ]Moderate [ ]Severe  Nausea:                             [ ]Mild [ ]Moderate [ ]Severe  Loss of appetite:              [ ]Mild [ ]Moderate [ ]Severe  Constipation:                    [ ]Mild [ ]Moderate [ ]Severe    PAINAD Score:    [email or web address].[email or web address]/cog/painad.pdf (Ctrl +  left click to view)  		  Other Symptoms:  [x]All other review of systems negative     Karnofsky Performance Score/Palliative Performance Status Version 2:    30     %        http://palliative.info/resource_material/PPSv2.pdf    PHYSICAL EXAM:    Vital Signs Last 24 Hrs  Temperature  Temp (F): 99.5 Degrees F  Temp (C) Temp (C): 37.5 Degrees C  Temp site Temp Site: axillary    Heart Rate  Heart Rate Heart Rate (beats/min): 71 /min  Heart Rate Method: noninvasive blood pressure monitor    Noninvasive Blood Pressure  BP Systolic Systolic: 155 mm Hg  BP Diastolic Diastolic (mm Hg): 71 mm Hg  Blood Pressure - Site Site: right upper arm  Blood Pressure - Method Method: electronic    Respiratory/Pulse Oximetry  Respiration Rate (breaths/min) Respiration Rate (breaths/min): 18 /min  SpO2 (%) SpO2 (%): 96 %  O2 delivery Patient On: supplemental O2    GENERAL:  [x ]Alert  [ ]Oriented x   [ ]Lethargic  [ ]Cachexia  [ ]Unarousable  [ ]Verbal  [x ]Non-Verbal    Behavioral:   [ ] Anxiety  [x] Delirium- possible hypoactive [ ] Agitation [ ] Other    HEENT:  [x]Normal   [ ]Dry mouth   [ ]ET Tube/Trach  [ ]Oral lesions    PULMONARY:   [x]Clear [x ]Tachypnea  [ ]Audible excessive secretions   [ ]Rhonchi        [ ]Right [ ]Left [ ]Bilateral  [ ]Crackles        [ ]Right [ ]Left [ ]Bilateral  [ ]Wheezing     [ ]Right [ ]Left [ ]Bilateral    CARDIOVASCULAR:    [x]Regular [ ]Irregular [ ]Tachy  [ ]Danny [ ]Murmur [ ]Other +S1 +S2      GASTROINTESTINAL:  [x]Soft  [ ]Distended   [ ]+BS  [x]Non tender [ ]Tender  [ ]PEG [ ]OGT/ NGT   Last BM:   See RN documentation in the EMR which I have reviewed. 12/ 20    GENITOURINARY:  [ ]Normal [ ] Incontinent   [ ]Oliguria/Anuria   [x]Prieto    MUSCULOSKELETAL:   [ ]Normal   [ ]Weakness  [x]Bed/Wheelchair bound [ ]Edema    NEUROLOGIC:   [ ]No focal deficits  [x] Cognitive impairment  [x ] Dysphagia [ ]Dysarthria [ ] Paresis [ ]Other     SKIN:   [x]Normal   [ ]Pressure ulcer(s)  [ ]Rash    CRITICAL CARE:  [ ] Shock Present  [ ]Septic [ ]Cardiogenic [ ]Neurologic [ ]Hypovolemic  [ ]  Vasopressors [ ]  Inotropes   [ ] Respiratory failure present  [ ] Acute  [ ] Chronic [ ] Hypoxic  [ ] Hypercarbic [ ] Other  [ ] Other organ failure     LABS:                        9.2    12.17 )-----------( 123      ( 17 Dec 2018 07:45 )x             28.8         RADIOLOGY & ADDITIONAL STUDIES: < from: Xray Chest 1 View- PORTABLE-Urgent (12.19.18 @ 15:25) >  Impression:    The heart is normal in size. Small left pleural effusion. Otherwise the   lungs appear to be clear. NG tube is in good position on the current   study. A pacer is in good position as well. Multiple metallic clips are   seen in the mediastinum from previous surgery.    KARY MANZANARES M.D., ATTENDING RADIOLOGIST  This document has been electronically signed. Dec 19 2018  3:27PM          < end of copied text >      PROTEIN CALORIE MALNUTRITION: [ ] yes   [ ]no  [x ] PPSV2 < or = 30% [ ] significant weight loss [x ] poor nutritional intake [ ] anasarca [ ] catabolic state   Albumin, Serum: 3.3 g/dL (12-16-18 @ 08:22)   Artificial Nutrition [ ]     REFERRALS:   [ ]Chaplaincy  [x] Hospice  [ ]Child Life  [ x]Social Work  [ ]Case management [ ]Holistic Therapy [ ] Physical Therapy [ ] Dietary   Goals of Care Document:

## 2018-12-21 NOTE — PROGRESS NOTE ADULT - PROBLEM SELECTOR PLAN 4
- Sinemet given via NGT  -c/w Quetiapine with parameters for sedation  -Pt on Nuplazid (pimavanserin); suggested family bring in med

## 2018-12-21 NOTE — PROGRESS NOTE ADULT - PROBLEM SELECTOR PLAN 6
Patient in PCU under Hospice for acute illness and symptom management. No surgical intervention offered for right acetabular/pelvic fracture.   Prieto placed 12/18/18. NGT placed 12/19/18 in order to administer PO meds. Possible removal of NGT today.

## 2018-12-21 NOTE — PROGRESS NOTE ADULT - PROBLEM SELECTOR PLAN 2
-Likely reactive in setting of acute fracture, downtrending  Urine cultures negative, cxr shows small pleural effusion, no obvious infiltrate

## 2018-12-21 NOTE — CHART NOTE - NSCHARTNOTEFT_GEN_A_CORE
per nurse, patient requiring multiple prn dilaudid for pain, and pain not controlled.  Per conversation with wife earlier today, agreed to dilaudid infusion if needed.  Will order dilaudid infusion 0.2mg/hr, with dilaudid 0.5mg prn.

## 2018-12-21 NOTE — PROGRESS NOTE ADULT - ATTENDING COMMENTS
I have personally seen and examined this patient and agree with the above assessment and plan, which I have reviewed and edited where appropriate.   Despite gentle hydration and KO feed tube to administer medication for parkinson's, there has been no clinical improvement and tube will be removed.  Family discussing continuation of hydration through the holiday.  My medical recommendation is that fluids are not beneficial and may cause harm.  If we see this we will stop the fluids.  Ongoing sx support.  Family  educated as to what to expect.  Questions answered.  Emotional support provided. I have personally seen and examined this patient and agree with the above assessment and plan, which I have reviewed and edited where appropriate.   Despite gentle hydration and KO feed tube to administer medication for parkinson's, there has been no clinical improvement and tube will be removed.  Family discussing continuation of hydration through the holiday.  My medical recommendation is that fluids are not beneficial and may cause harm.  If we see this we will stop the fluids.  Ongoing sx support.  Family  educated as to what to expect.  Questions answered.  Emotional support provided.  40      minutes for advanced care planning discussion separate and in addition to the e and m service provided.

## 2018-12-21 NOTE — PROGRESS NOTE ADULT - PROBLEM SELECTOR PLAN 1
-s/p witnessed fall  -Ortho eval appreciated, no acute surgical intervention planned for right acetabular hip fx.  continue dilaudid 0.2 IV q2 prn severe pain  -Incentive Spirometry  -Plan for repeat o/p XR in 1 week, f/u with DR. Rodriguez

## 2018-12-22 VITALS
RESPIRATION RATE: 18 BRPM | HEART RATE: 82 BPM | DIASTOLIC BLOOD PRESSURE: 52 MMHG | TEMPERATURE: 103 F | OXYGEN SATURATION: 93 % | SYSTOLIC BLOOD PRESSURE: 93 MMHG

## 2018-12-22 PROCEDURE — 72125 CT NECK SPINE W/O DYE: CPT

## 2018-12-22 PROCEDURE — 72170 X-RAY EXAM OF PELVIS: CPT

## 2018-12-22 PROCEDURE — 99233 SBSQ HOSP IP/OBS HIGH 50: CPT | Mod: GC

## 2018-12-22 PROCEDURE — 72190 X-RAY EXAM OF PELVIS: CPT

## 2018-12-22 PROCEDURE — 86900 BLOOD TYPING SEROLOGIC ABO: CPT

## 2018-12-22 PROCEDURE — 96375 TX/PRO/DX INJ NEW DRUG ADDON: CPT

## 2018-12-22 PROCEDURE — 73552 X-RAY EXAM OF FEMUR 2/>: CPT

## 2018-12-22 PROCEDURE — 81001 URINALYSIS AUTO W/SCOPE: CPT

## 2018-12-22 PROCEDURE — 99285 EMERGENCY DEPT VISIT HI MDM: CPT | Mod: 25

## 2018-12-22 PROCEDURE — 86901 BLOOD TYPING SEROLOGIC RH(D): CPT

## 2018-12-22 PROCEDURE — 71045 X-RAY EXAM CHEST 1 VIEW: CPT

## 2018-12-22 PROCEDURE — 80053 COMPREHEN METABOLIC PANEL: CPT

## 2018-12-22 PROCEDURE — 73502 X-RAY EXAM HIP UNI 2-3 VIEWS: CPT

## 2018-12-22 PROCEDURE — 72192 CT PELVIS W/O DYE: CPT

## 2018-12-22 PROCEDURE — 85027 COMPLETE CBC AUTOMATED: CPT

## 2018-12-22 PROCEDURE — 96374 THER/PROPH/DIAG INJ IV PUSH: CPT

## 2018-12-22 PROCEDURE — 87086 URINE CULTURE/COLONY COUNT: CPT

## 2018-12-22 PROCEDURE — 84100 ASSAY OF PHOSPHORUS: CPT

## 2018-12-22 PROCEDURE — 70450 CT HEAD/BRAIN W/O DYE: CPT

## 2018-12-22 PROCEDURE — 86850 RBC ANTIBODY SCREEN: CPT

## 2018-12-22 PROCEDURE — 76377 3D RENDER W/INTRP POSTPROCES: CPT

## 2018-12-22 PROCEDURE — 83735 ASSAY OF MAGNESIUM: CPT

## 2018-12-22 PROCEDURE — 82962 GLUCOSE BLOOD TEST: CPT

## 2018-12-22 RX ORDER — ACETAMINOPHEN 500 MG
650 TABLET ORAL EVERY 6 HOURS
Qty: 0 | Refills: 0 | Status: DISCONTINUED | OUTPATIENT
Start: 2018-12-22 | End: 2018-12-22

## 2018-12-22 RX ADMIN — Medication 0.25 MILLIGRAM(S): at 05:52

## 2018-12-22 RX ADMIN — Medication 650 MILLIGRAM(S): at 07:46

## 2018-12-22 RX ADMIN — HYDROMORPHONE HYDROCHLORIDE 0.5 MILLIGRAM(S): 2 INJECTION INTRAMUSCULAR; INTRAVENOUS; SUBCUTANEOUS at 09:15

## 2018-12-22 RX ADMIN — SODIUM CHLORIDE 10 MILLILITER(S): 9 INJECTION INTRAMUSCULAR; INTRAVENOUS; SUBCUTANEOUS at 07:45

## 2018-12-22 RX ADMIN — HYDROMORPHONE HYDROCHLORIDE 0.5 MILLIGRAM(S): 2 INJECTION INTRAMUSCULAR; INTRAVENOUS; SUBCUTANEOUS at 05:53

## 2018-12-22 RX ADMIN — HYDROMORPHONE HYDROCHLORIDE 0.2 MG/HR: 2 INJECTION INTRAMUSCULAR; INTRAVENOUS; SUBCUTANEOUS at 07:44

## 2018-12-22 RX ADMIN — Medication 650 MILLIGRAM(S): at 09:06

## 2018-12-22 NOTE — DISCHARGE NOTE FOR THE EXPIRED PATIENT - HOSPITAL COURSE
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 Ortho consulted and there was no surgical intervention.  Patient was transferred to PCU and remained lethargic, unable to take PO medications.  NG tube was tried to evaluate if symptoms improved with Parkinson's medications, however no improvement was noted and NG tube was removed on 18.  Patient  on 18.

## 2018-12-22 NOTE — PROGRESS NOTE ADULT - PROBLEM SELECTOR PLAN 1
-s/p witnessed fall  -Ortho eval appreciated, no acute surgical intervention planned for right acetabular hip fx.  continue dilaudid 0.2 IV q2 prn severe pain  -Incentive Spirometry  -Plan for repeat o/p XR in 1 week, f/u with DR. Rodriguez -s/p witnessed fall  -Ortho eval appreciated, no acute surgical intervention planned for right acetabular hip fx.  continue dilaudid 0.2/hr with 0.2mg IV q2 prn severe pain  -Incentive Spirometry  -Plan for repeat o/p XR in 1 week, f/u with DR. Rodriguez

## 2018-12-22 NOTE — PROVIDER CONTACT NOTE (OTHER) - ASSESSMENT
No response to external stimuli.   No spontaneous respirations.   No apical heart rate.   Negative pupillary response to light.

## 2018-12-22 NOTE — PROGRESS NOTE ADULT - SUBJECTIVE AND OBJECTIVE BOX
GAP TEAM PALLIATIVE CARE UNIT PROGRESS NOTE:      [x] Patient on hospice program.    INDICATION FOR PALLIATIVE CARE UNIT SERVICES: acute illness management, symptom management s/p fall with right acetabular fracture    INTERVAL HPI/OVERNIGHT EVENTS: Patient had a fever of 103.1 this morning, tylenol suppository given. Patient now on dilaudid infusion at 0.2mg/hr with 0.5mg prn.  NG tube removed yesterday.    DNR on chart: Yes    Allergies    iodine (Unknown)    Intolerances    Keppra (Other)    MEDICATIONS  (STANDING):  famotidine Injectable 20 milliGRAM(s) IV Push daily  HYDROmorphone Infusion 0.2 mG/Hr (0.2 mL/Hr) IV Continuous <Continuous>  sodium chloride 0.9%. 1000 milliLiter(s) (10 mL/Hr) IV Continuous <Continuous>    MEDICATIONS  (PRN):  acetaminophen  Suppository .. 650 milliGRAM(s) Rectal every 6 hours PRN Temp greater or equal to 38C (100.4F)  glycopyrrolate Injectable 0.4 milliGRAM(s) IV Push every 6 hours PRN secretions  HYDROmorphone  Injectable 0.5 milliGRAM(s) IV Push every 1 hour PRN moderate/severe pain  HYDROmorphone  Injectable 0.5 milliGRAM(s) IV Push every 1 hour PRN dyspnea  LORazepam   Injectable 0.25 milliGRAM(s) IV Push every 1 hour PRN Agitation      ITEMS UNCHECKED ARE NOT PRESENT    PRESENT SYMPTOMS: [x]Unable to obtain due to poor mentation     Source if other than patient:  [x]Family   [x]Team     Pain (Impact on QOL):  decreased QOL	  Location:     right hip  Minimal acceptable level (0-10 scale):  Aggravating factors:  movement  Quality:  Radiation:  Severity (0-10 scale):     Dyspnea:                           [ ]Mild [ ]Moderate [ ]Severe  Anxiety:                             [ ]Mild [ ]Moderate [ ]Severe  Fatigue:                             [ ]Mild [ ]Moderate [ ]Severe  Nausea:                             [ ]Mild [ ]Moderate [ ]Severe  Loss of appetite:              [ ]Mild [ ]Moderate [ ]Severe  Constipation:                    [ ]Mild [ ]Moderate [ ]Severe    PAINAD Score:    [email or web address]/painad.pdf (Ctrl +  left click to view)  		  Other Symptoms:  [x]All other review of systems negative     Karnofsky Performance Score/Palliative Performance Status Version 2:    10     %        http://palliative.info/resource_material/PPSv2.pdf    PHYSICAL EXAM:    Vital Signs Last 24 Hrs  T(C): 39.5 (22 Dec 2018 07:39), Max: 39.5 (22 Dec 2018 07:39)  T(F): 103.1 (22 Dec 2018 07:39), Max: 103.1 (22 Dec 2018 07:39)  HR: 82 (22 Dec 2018 07:39) (82 - 82)  BP: 93/52 (22 Dec 2018 07:39) (93/52 - 93/52)  BP(mean): --  RR: 18 (22 Dec 2018 07:39) (18 - 18)  SpO2: 93% (22 Dec 2018 07:39) (93% - 93%)    GENERAL:  [ ]Alert  [ ]Oriented x   [ x]Lethargic  [ ]Cachexia  [ ]Unarousable  [ ]Verbal  [x ]Non-Verbal    Behavioral:   [ ] Anxiety  [x] Delirium- possible hypoactive [ ] Agitation [ ] Other    HEENT:  [x]Normal   [ ]Dry mouth   [ ]ET Tube/Trach  [ ]Oral lesions    PULMONARY:   [x]Clear [x ]Tachypnea  [ ]Audible excessive secretions   [ ]Rhonchi        [ ]Right [ ]Left [ ]Bilateral  [ ]Crackles        [ ]Right [ ]Left [ ]Bilateral  [ ]Wheezing     [ ]Right [ ]Left [ ]Bilateral    CARDIOVASCULAR:    [x]Regular [ ]Irregular [ ]Tachy  [ ]Danny [ ]Murmur [ ]Other +S1 +S2      GASTROINTESTINAL:  [x]Soft  [ ]Distended   [ ]+BS  [x]Non tender [ ]Tender  [ ]PEG [ ]OGT/ NGT   Last BM:   12/ 20    GENITOURINARY:  [ ]Normal [ ] Incontinent   [ ]Oliguria/Anuria   [x]Prieto    MUSCULOSKELETAL:   [ ]Normal   [ ]Weakness  [x]Bed/Wheelchair bound [ ]Edema    NEUROLOGIC:   [ ]No focal deficits  [x] Cognitive impairment  [x ] Dysphagia [ ]Dysarthria [ ] Paresis [ ]Other     SKIN:   [x]Normal   [ ]Pressure ulcer(s)  [ ]Rash    CRITICAL CARE:  [ ] Shock Present  [ ]Septic [ ]Cardiogenic [ ]Neurologic [ ]Hypovolemic  [ ]  Vasopressors [ ]  Inotropes   [ ] Respiratory failure present  [ ] Acute  [ ] Chronic [ ] Hypoxic  [ ] Hypercarbic [ ] Other  [ ] Other organ failure     LABS:                        9.2    12.17 )-----------( 123      ( 17 Dec 2018 07:45 )x             28.8         RADIOLOGY & ADDITIONAL STUDIES: < from: Xray Chest 1 View- PORTABLE-Urgent (12.19.18 @ 15:25) >  Impression:    The heart is normal in size. Small left pleural effusion. Otherwise the   lungs appear to be clear. NG tube is in good position on the current   study. A pacer is in good position as well. Multiple metallic clips are   seen in the mediastinum from previous surgery.    KARY MANZANARES M.D., ATTENDING RADIOLOGIST  This document has been electronically signed. Dec 19 2018  3:27PM          < end of copied text >      PROTEIN CALORIE MALNUTRITION: [ ] yes   [ ]no  [x ] PPSV2 < or = 30% [ ] significant weight loss [x ] poor nutritional intake [ ] anasarca [ ] catabolic state   Albumin, Serum: 3.3 g/dL (12-16-18 @ 08:22)   Artificial Nutrition [ ]     REFERRALS:   [ ]Chaplaincy  [x] Hospice  [ ]Child Life  [ x]Social Work  [ ]Case management [ ]Holistic Therapy [ ] Physical Therapy [ ] Dietary   Goals of Care Document:

## 2018-12-22 NOTE — PROGRESS NOTE ADULT - ATTENDING COMMENTS
I have personally seen and examined this patient and agree with the above assessment and plan, which I have reviewed and edited where appropriate.   Despite gentle hydration and KO feed tube to administer medication for parkinson's, there has been no clinical improvement and tube will be removed.  Family discussing continuation of hydration through the holiday.  My medical recommendation is that fluids are not beneficial and may cause harm.  If we see this we will stop the fluids.  Ongoing sx support.  Family  educated as to what to expect.  Questions answered.  Emotional support provided.  40      minutes for advanced care planning discussion separate and in addition to the e and m service provided. Pt seen with fellow.  Agree with above.  Goal is for comfort.  Dilaudid gtt with prn.  Pt appears to be actively dying.  Family aware

## 2018-12-22 NOTE — PROGRESS NOTE ADULT - REASON FOR ADMISSION
fall/acetabular fracture

## 2018-12-22 NOTE — PROGRESS NOTE ADULT - PROBLEM SELECTOR PLAN 6
Patient in PCU under Hospice for acute illness and symptom management. No surgical intervention offered for right acetabular/pelvic fracture.   Prieto placed 12/18/18. NGT placed 12/19/18 in order to administer PO meds, removed on 12/21 after discussion with family.  patient on dilaudid infusion 0.2mg/hr, with dilaudid 0.5mg prn

## 2019-09-28 NOTE — H&P ADULT - PROBLEM SELECTOR PROBLEM 1
pmd Closed nondisplaced fracture of right acetabulum, unspecified portion of acetabulum, initial encounter

## 2019-11-10 NOTE — ED ADULT NURSE NOTE - NS ED NURSE PATIENT LEFT UNIT TIME
normal sinus rhythm, Normal axis, Normal IN interval and QRS complex. There are no acute ischemic ST or T-wave changes.
05:17

## 2019-11-13 NOTE — DISCHARGE NOTE ADULT - ADMISSION DATE +STARTOFVISITDATE
Statement Selected Quality 130: Documentation Of Current Medications In The Medical Record: Current Medications Documented Quality 402: Tobacco Use And Help With Quitting Among Adolescents: Patient screened for tobacco and is an ex-smoker Quality 110: Preventive Care And Screening: Influenza Immunization: Influenza Immunization not Administered for Documented Reasons. Detail Level: Detailed Quality 131: Pain Assessment And Follow-Up: Pain assessment using a standardized tool is documented as negative, no follow-up plan required

## 2019-11-14 NOTE — PROGRESS NOTE ADULT - PROBLEM SELECTOR PLAN 3
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  -gentle hydration Consent (Lip)/Introductory Paragraph: The rationale for Mohs was explained to the patient and consent was obtained. The risks, benefits and alternatives to therapy were discussed in detail. Specifically, the risks of lip deformity, changes in the oral aperture, infection, scarring, bleeding, prolonged wound healing, incomplete removal, allergy to anesthesia, nerve injury and recurrence were addressed. Prior to the procedure, the treatment site was clearly identified and confirmed by the patient. All components of Universal Protocol/PAUSE Rule completed.

## 2020-05-02 NOTE — H&P ADULT. - PROBLEM/PLAN-4
Please review your answers. If you need to make any changes, use the BACK button on your browser. Your answer to Question 1 - \"Do you have any symptoms that are bothering you? \" = YES    Your answer to Question 2 - \"Are you able to do the same work as before? \" = NO    Your answer to Question 3 - \"Are you able to keep up with your hobbies? \" = YES    Your answer to Question 4 - \"Have you maintained your ties to friends and family? \" = YES    Your answer to Question 5 - \"Do you need help making a simple meal, doing household chores, or balancing a checkbook? \" = YES    Your answer to Question 6 - \"Do you need help with shopping or traveling close to home? \" = YES    Your answer to Question 7 - \"Do you need another person to help you walk? \" = YES    Your answer to Question 8 - \"Do you need help with eating, going to the toilet, or bathing? \" = YES    Your answer to Question 9 - \"Do you stay in bed most of the day and need constant nursing care? \" = NO      The modified Lena Scale (mRS) is: <b=\"\"4     Potential conflicts are listed here (use the BACK button on your browser to fix errors): The moderately severe disability of mRS = 4 conflicts with the ability to keep up with hobbies described in Q#3. Levels of the modified Lena Scale (mRS):   0 - No symptoms. 1 - No significant disability. Able to carry out all usual activities, despite some symptoms. 2 - Slight disability. Able to look after own affairs without assistance, but unable to carry out all previous activities. 3 - Moderate disability. Requires some help, but able to walk unassisted. 4 - Moderately severe disability. Unable to attend to own bodily needs without assistance or unable to walk unassisted. 5 - Severe disability. Requires constant nursing care and attention, bedridden. DISPLAY PLAN FREE TEXT

## 2022-11-28 NOTE — ED PROVIDER NOTE - OBJECTIVE STATEMENT
Elizabeth Hoyt M.D: 87M hx parkinsons dz, on home hospice, BIBEMS after witnessed fall in bathroom. slipped and fell and landed on rightside. did hit head. no loc. on ASA/plavix. has been unable to ambulate since fall due to right hip pain. FROM